# Patient Record
Sex: MALE | Race: WHITE | HISPANIC OR LATINO | URBAN - METROPOLITAN AREA
[De-identification: names, ages, dates, MRNs, and addresses within clinical notes are randomized per-mention and may not be internally consistent; named-entity substitution may affect disease eponyms.]

---

## 2022-11-29 ENCOUNTER — HOSPITAL ENCOUNTER (EMERGENCY)
Facility: OTHER | Age: 27
Discharge: HOME | End: 2022-11-29
Attending: EMERGENCY MEDICINE
Payer: COMMERCIAL

## 2022-11-29 VITALS
TEMPERATURE: 98 F | OXYGEN SATURATION: 99 % | HEART RATE: 76 BPM | DIASTOLIC BLOOD PRESSURE: 82 MMHG | RESPIRATION RATE: 20 BRPM | SYSTOLIC BLOOD PRESSURE: 134 MMHG | WEIGHT: 130 LBS

## 2022-11-29 DIAGNOSIS — S20.212A CONTUSION OF RIB ON LEFT SIDE, INITIAL ENCOUNTER: ICD-10-CM

## 2022-11-29 DIAGNOSIS — S16.1XXA STRAIN OF NECK MUSCLE, INITIAL ENCOUNTER: Primary | ICD-10-CM

## 2022-11-29 DIAGNOSIS — R07.81 RIB PAIN: ICD-10-CM

## 2022-11-29 PROCEDURE — 99285 EMERGENCY DEPT VISIT HI MDM: CPT | Mod: 25

## 2022-11-29 PROCEDURE — 96372 THER/PROPH/DIAG INJ SC/IM: CPT | Performed by: EMERGENCY MEDICINE

## 2022-11-29 PROCEDURE — 63600175 PHARM REV CODE 636 W HCPCS: Performed by: EMERGENCY MEDICINE

## 2022-11-29 PROCEDURE — 25000003 PHARM REV CODE 250: Performed by: EMERGENCY MEDICINE

## 2022-11-29 RX ORDER — METHOCARBAMOL 500 MG/1
1000 TABLET, FILM COATED ORAL
Status: COMPLETED | OUTPATIENT
Start: 2022-11-29 | End: 2022-11-29

## 2022-11-29 RX ORDER — METHOCARBAMOL 500 MG/1
1000 TABLET, FILM COATED ORAL 3 TIMES DAILY PRN
Qty: 20 TABLET | Refills: 0 | Status: SHIPPED | OUTPATIENT
Start: 2022-11-29 | End: 2022-12-04

## 2022-11-29 RX ORDER — KETOROLAC TROMETHAMINE 30 MG/ML
30 INJECTION, SOLUTION INTRAMUSCULAR; INTRAVENOUS
Status: COMPLETED | OUTPATIENT
Start: 2022-11-29 | End: 2022-11-29

## 2022-11-29 RX ORDER — IBUPROFEN 800 MG/1
800 TABLET ORAL EVERY 6 HOURS PRN
Qty: 20 TABLET | Refills: 0 | Status: SHIPPED | OUTPATIENT
Start: 2022-11-29

## 2022-11-29 RX ADMIN — METHOCARBAMOL 1000 MG: 500 TABLET ORAL at 09:11

## 2022-11-29 RX ADMIN — KETOROLAC TROMETHAMINE 30 MG: 30 INJECTION, SOLUTION INTRAMUSCULAR; INTRAVENOUS at 09:11
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
## 2022-11-29 NOTE — DISCHARGE INSTRUCTIONS
We have prescribed medication for pain.  Please fill and take as directed.    Please return to the ER if you have chest pain, difficulty breathing, fevers, altered mental status, dizziness, weakness, or any other concerns.      Follow up with your primary care physician.

## 2022-11-29 NOTE — Clinical Note
"Bharat Morel" Fermin was seen and treated in our emergency department on 11/29/2022.  He may return to work on 11/30/2022.       If you have any questions or concerns, please don't hesitate to call.       RN    "

## 2024-03-06 ENCOUNTER — HOSPITAL ENCOUNTER (INPATIENT)
Facility: OTHER | Age: 29
LOS: 3 days | Discharge: HOME | DRG: 398 | End: 2024-03-10
Attending: EMERGENCY MEDICINE | Admitting: HOSPITALIST

## 2024-03-06 DIAGNOSIS — K35.80 ACUTE APPENDICITIS: ICD-10-CM

## 2024-03-06 DIAGNOSIS — K37 APPENDICITIS: ICD-10-CM

## 2024-03-06 DIAGNOSIS — K35.30 ACUTE APPENDICITIS WITH LOCALIZED PERITONITIS, WITHOUT PERFORATION, ABSCESS, OR GANGRENE: Primary | ICD-10-CM

## 2024-03-06 LAB
ALBUMIN SERPL BCP-MCNC: 4.2 G/DL (ref 3.5–5.2)
ALP SERPL-CCNC: 70 U/L (ref 55–135)
ALT SERPL W/O P-5'-P-CCNC: 13 U/L (ref 10–44)
ANION GAP SERPL CALC-SCNC: 11 MMOL/L (ref 8–16)
AST SERPL-CCNC: 16 U/L (ref 10–40)
BASOPHILS # BLD AUTO: 0.03 K/UL (ref 0–0.2)
BASOPHILS NFR BLD: 0.2 % (ref 0–1.9)
BILIRUB SERPL-MCNC: 0.6 MG/DL (ref 0.1–1)
BILIRUB UR QL STRIP: NEGATIVE
BUN SERPL-MCNC: 7 MG/DL (ref 6–20)
CALCIUM SERPL-MCNC: 9.6 MG/DL (ref 8.7–10.5)
CHLORIDE SERPL-SCNC: 98 MMOL/L (ref 95–110)
CLARITY UR: CLEAR
CO2 SERPL-SCNC: 28 MMOL/L (ref 23–29)
COLOR UR: YELLOW
CREAT SERPL-MCNC: 1.1 MG/DL (ref 0.5–1.4)
DIFFERENTIAL METHOD BLD: ABNORMAL
EOSINOPHIL # BLD AUTO: 0 K/UL (ref 0–0.5)
EOSINOPHIL NFR BLD: 0.1 % (ref 0–8)
ERYTHROCYTE [DISTWIDTH] IN BLOOD BY AUTOMATED COUNT: 11.9 % (ref 11.5–14.5)
EST. GFR  (NO RACE VARIABLE): >60 ML/MIN/1.73 M^2
GLUCOSE SERPL-MCNC: 110 MG/DL (ref 70–110)
GLUCOSE UR QL STRIP: NEGATIVE
HCT VFR BLD AUTO: 43 % (ref 40–54)
HCV AB SERPL QL IA: NEGATIVE
HGB BLD-MCNC: 14.9 G/DL (ref 14–18)
HGB UR QL STRIP: NEGATIVE
HIV 1+2 AB+HIV1 P24 AG SERPL QL IA: NEGATIVE
IMM GRANULOCYTES # BLD AUTO: 0.07 K/UL (ref 0–0.04)
IMM GRANULOCYTES NFR BLD AUTO: 0.5 % (ref 0–0.5)
KETONES UR QL STRIP: NEGATIVE
LACTATE SERPL-SCNC: 0.8 MMOL/L (ref 0.5–2.2)
LACTATE SERPL-SCNC: 1.5 MMOL/L (ref 0.5–2.2)
LEUKOCYTE ESTERASE UR QL STRIP: NEGATIVE
LIPASE SERPL-CCNC: 21 U/L (ref 4–60)
LYMPHOCYTES # BLD AUTO: 0.8 K/UL (ref 1–4.8)
LYMPHOCYTES NFR BLD: 5.4 % (ref 18–48)
MCH RBC QN AUTO: 30.5 PG (ref 27–31)
MCHC RBC AUTO-ENTMCNC: 34.7 G/DL (ref 32–36)
MCV RBC AUTO: 88 FL (ref 82–98)
MONOCYTES # BLD AUTO: 0.9 K/UL (ref 0.3–1)
MONOCYTES NFR BLD: 6.5 % (ref 4–15)
NEUTROPHILS # BLD AUTO: 12.6 K/UL (ref 1.8–7.7)
NEUTROPHILS NFR BLD: 87.3 % (ref 38–73)
NITRITE UR QL STRIP: NEGATIVE
NRBC BLD-RTO: 0 /100 WBC
PH UR STRIP: 7 [PH] (ref 5–8)
PLATELET # BLD AUTO: 280 K/UL (ref 150–450)
PMV BLD AUTO: 8.7 FL (ref 9.2–12.9)
POTASSIUM SERPL-SCNC: 3.6 MMOL/L (ref 3.5–5.1)
PROT SERPL-MCNC: 7.9 G/DL (ref 6–8.4)
PROT UR QL STRIP: NEGATIVE
RBC # BLD AUTO: 4.89 M/UL (ref 4.6–6.2)
SODIUM SERPL-SCNC: 137 MMOL/L (ref 136–145)
SP GR UR STRIP: 1.01 (ref 1–1.03)
URN SPEC COLLECT METH UR: NORMAL
UROBILINOGEN UR STRIP-ACNC: NEGATIVE EU/DL
WBC # BLD AUTO: 14.46 K/UL (ref 3.9–12.7)

## 2024-03-06 PROCEDURE — 87389 HIV-1 AG W/HIV-1&-2 AB AG IA: CPT | Performed by: EMERGENCY MEDICINE

## 2024-03-06 PROCEDURE — 96361 HYDRATE IV INFUSION ADD-ON: CPT

## 2024-03-06 PROCEDURE — 99285 EMERGENCY DEPT VISIT HI MDM: CPT | Mod: 25

## 2024-03-06 PROCEDURE — 25500020 PHARM REV CODE 255: Performed by: EMERGENCY MEDICINE

## 2024-03-06 PROCEDURE — 87040 BLOOD CULTURE FOR BACTERIA: CPT | Mod: 59 | Performed by: EMERGENCY MEDICINE

## 2024-03-06 PROCEDURE — 86803 HEPATITIS C AB TEST: CPT | Performed by: EMERGENCY MEDICINE

## 2024-03-06 PROCEDURE — 63600175 PHARM REV CODE 636 W HCPCS: Performed by: INTERNAL MEDICINE

## 2024-03-06 PROCEDURE — 96375 TX/PRO/DX INJ NEW DRUG ADDON: CPT

## 2024-03-06 PROCEDURE — 85025 COMPLETE CBC W/AUTO DIFF WBC: CPT | Performed by: EMERGENCY MEDICINE

## 2024-03-06 PROCEDURE — 81003 URINALYSIS AUTO W/O SCOPE: CPT | Performed by: EMERGENCY MEDICINE

## 2024-03-06 PROCEDURE — 80053 COMPREHEN METABOLIC PANEL: CPT | Performed by: EMERGENCY MEDICINE

## 2024-03-06 PROCEDURE — 25000003 PHARM REV CODE 250: Performed by: EMERGENCY MEDICINE

## 2024-03-06 PROCEDURE — 96376 TX/PRO/DX INJ SAME DRUG ADON: CPT

## 2024-03-06 PROCEDURE — 36415 COLL VENOUS BLD VENIPUNCTURE: CPT | Performed by: EMERGENCY MEDICINE

## 2024-03-06 PROCEDURE — 83690 ASSAY OF LIPASE: CPT | Performed by: EMERGENCY MEDICINE

## 2024-03-06 PROCEDURE — 25000003 PHARM REV CODE 250: Performed by: INTERNAL MEDICINE

## 2024-03-06 PROCEDURE — 63600175 PHARM REV CODE 636 W HCPCS: Performed by: EMERGENCY MEDICINE

## 2024-03-06 PROCEDURE — 83605 ASSAY OF LACTIC ACID: CPT | Performed by: EMERGENCY MEDICINE

## 2024-03-06 PROCEDURE — 96365 THER/PROPH/DIAG IV INF INIT: CPT

## 2024-03-06 RX ORDER — KETOROLAC TROMETHAMINE 30 MG/ML
15 INJECTION, SOLUTION INTRAMUSCULAR; INTRAVENOUS
Status: COMPLETED | OUTPATIENT
Start: 2024-03-06 | End: 2024-03-06

## 2024-03-06 RX ORDER — HYDROMORPHONE HYDROCHLORIDE 1 MG/ML
0.5 INJECTION, SOLUTION INTRAMUSCULAR; INTRAVENOUS; SUBCUTANEOUS
Status: COMPLETED | OUTPATIENT
Start: 2024-03-06 | End: 2024-03-06

## 2024-03-06 RX ORDER — ONDANSETRON HYDROCHLORIDE 2 MG/ML
4 INJECTION, SOLUTION INTRAVENOUS
Status: COMPLETED | OUTPATIENT
Start: 2024-03-06 | End: 2024-03-06

## 2024-03-06 RX ORDER — ACETAMINOPHEN 500 MG
1000 TABLET ORAL
Status: COMPLETED | OUTPATIENT
Start: 2024-03-06 | End: 2024-03-06

## 2024-03-06 RX ADMIN — KETOROLAC TROMETHAMINE 15 MG: 30 INJECTION, SOLUTION INTRAMUSCULAR; INTRAVENOUS at 07:03

## 2024-03-06 RX ADMIN — IOHEXOL 75 ML: 350 INJECTION, SOLUTION INTRAVENOUS at 09:03

## 2024-03-06 RX ADMIN — HYDROMORPHONE HYDROCHLORIDE 0.5 MG: 0.5 INJECTION, SOLUTION INTRAMUSCULAR; INTRAVENOUS; SUBCUTANEOUS at 07:03

## 2024-03-06 RX ADMIN — SODIUM CHLORIDE 1000 ML: 0.9 INJECTION, SOLUTION INTRAVENOUS at 09:03

## 2024-03-06 RX ADMIN — HYDROMORPHONE HYDROCHLORIDE 0.5 MG: 0.5 INJECTION, SOLUTION INTRAMUSCULAR; INTRAVENOUS; SUBCUTANEOUS at 10:03

## 2024-03-06 RX ADMIN — ACETAMINOPHEN 1000 MG: 500 TABLET ORAL at 09:03

## 2024-03-06 RX ADMIN — SODIUM CHLORIDE 1000 ML: 0.9 INJECTION, SOLUTION INTRAVENOUS at 07:03

## 2024-03-06 RX ADMIN — ONDANSETRON 4 MG: 2 INJECTION INTRAMUSCULAR; INTRAVENOUS at 07:03

## 2024-03-06 RX ADMIN — PIPERACILLIN SODIUM AND TAZOBACTAM SODIUM 4.5 G: 4; .5 INJECTION, POWDER, LYOPHILIZED, FOR SOLUTION INTRAVENOUS at 09:03

## 2024-03-07 ENCOUNTER — ANESTHESIA EVENT (OUTPATIENT)
Dept: SURGERY | Facility: OTHER | Age: 29
DRG: 398 | End: 2024-03-07

## 2024-03-07 ENCOUNTER — ANESTHESIA (OUTPATIENT)
Dept: SURGERY | Facility: OTHER | Age: 29
DRG: 398 | End: 2024-03-07

## 2024-03-07 PROBLEM — K35.30 ACUTE APPENDICITIS WITH LOCALIZED PERITONITIS: Status: ACTIVE | Noted: 2024-03-07

## 2024-03-07 LAB
ALBUMIN SERPL BCP-MCNC: 3.6 G/DL (ref 3.5–5.2)
ALP SERPL-CCNC: 58 U/L (ref 55–135)
ALT SERPL W/O P-5'-P-CCNC: 12 U/L (ref 10–44)
ANION GAP SERPL CALC-SCNC: 8 MMOL/L (ref 8–16)
AST SERPL-CCNC: 16 U/L (ref 10–40)
BASOPHILS # BLD AUTO: 0.07 K/UL (ref 0–0.2)
BASOPHILS NFR BLD: 0.4 % (ref 0–1.9)
BILIRUB SERPL-MCNC: 0.7 MG/DL (ref 0.1–1)
BUN SERPL-MCNC: 7 MG/DL (ref 6–20)
CALCIUM SERPL-MCNC: 8.5 MG/DL (ref 8.7–10.5)
CHLORIDE SERPL-SCNC: 104 MMOL/L (ref 95–110)
CO2 SERPL-SCNC: 25 MMOL/L (ref 23–29)
CREAT SERPL-MCNC: 0.9 MG/DL (ref 0.5–1.4)
DIFFERENTIAL METHOD BLD: ABNORMAL
EOSINOPHIL # BLD AUTO: 0 K/UL (ref 0–0.5)
EOSINOPHIL NFR BLD: 0 % (ref 0–8)
ERYTHROCYTE [DISTWIDTH] IN BLOOD BY AUTOMATED COUNT: 12 % (ref 11.5–14.5)
EST. GFR  (NO RACE VARIABLE): >60 ML/MIN/1.73 M^2
GLUCOSE SERPL-MCNC: 112 MG/DL (ref 70–110)
HCT VFR BLD AUTO: 38.3 % (ref 40–54)
HGB BLD-MCNC: 13.3 G/DL (ref 14–18)
IMM GRANULOCYTES # BLD AUTO: 0.07 K/UL (ref 0–0.04)
IMM GRANULOCYTES NFR BLD AUTO: 0.4 % (ref 0–0.5)
LYMPHOCYTES # BLD AUTO: 0.8 K/UL (ref 1–4.8)
LYMPHOCYTES NFR BLD: 4.8 % (ref 18–48)
MAGNESIUM SERPL-MCNC: 1.6 MG/DL (ref 1.6–2.6)
MCH RBC QN AUTO: 30.7 PG (ref 27–31)
MCHC RBC AUTO-ENTMCNC: 34.7 G/DL (ref 32–36)
MCV RBC AUTO: 89 FL (ref 82–98)
MONOCYTES # BLD AUTO: 1 K/UL (ref 0.3–1)
MONOCYTES NFR BLD: 5.6 % (ref 4–15)
NEUTROPHILS # BLD AUTO: 15.4 K/UL (ref 1.8–7.7)
NEUTROPHILS NFR BLD: 88.8 % (ref 38–73)
NRBC BLD-RTO: 0 /100 WBC
PHOSPHATE SERPL-MCNC: 3.7 MG/DL (ref 2.7–4.5)
PLATELET # BLD AUTO: 217 K/UL (ref 150–450)
PMV BLD AUTO: 8.8 FL (ref 9.2–12.9)
POTASSIUM SERPL-SCNC: 3.8 MMOL/L (ref 3.5–5.1)
PROT SERPL-MCNC: 6.8 G/DL (ref 6–8.4)
RBC # BLD AUTO: 4.33 M/UL (ref 4.6–6.2)
SODIUM SERPL-SCNC: 137 MMOL/L (ref 136–145)
WBC # BLD AUTO: 17.32 K/UL (ref 3.9–12.7)

## 2024-03-07 PROCEDURE — 36415 COLL VENOUS BLD VENIPUNCTURE: CPT | Performed by: NURSE PRACTITIONER

## 2024-03-07 PROCEDURE — 63600175 PHARM REV CODE 636 W HCPCS: Performed by: SPECIALIST

## 2024-03-07 PROCEDURE — 64488 TAP BLOCK BI INJECTION: CPT | Mod: 59,,, | Performed by: ANESTHESIOLOGY

## 2024-03-07 PROCEDURE — 25000003 PHARM REV CODE 250: Performed by: SPECIALIST

## 2024-03-07 PROCEDURE — 63600175 PHARM REV CODE 636 W HCPCS

## 2024-03-07 PROCEDURE — 37000009 HC ANESTHESIA EA ADD 15 MINS: Performed by: SPECIALIST

## 2024-03-07 PROCEDURE — 25000003 PHARM REV CODE 250: Performed by: HOSPITALIST

## 2024-03-07 PROCEDURE — 25000003 PHARM REV CODE 250: Performed by: NURSE PRACTITIONER

## 2024-03-07 PROCEDURE — 25000003 PHARM REV CODE 250: Performed by: STUDENT IN AN ORGANIZED HEALTH CARE EDUCATION/TRAINING PROGRAM

## 2024-03-07 PROCEDURE — 63600175 PHARM REV CODE 636 W HCPCS: Performed by: NURSE PRACTITIONER

## 2024-03-07 PROCEDURE — 63600175 PHARM REV CODE 636 W HCPCS: Performed by: STUDENT IN AN ORGANIZED HEALTH CARE EDUCATION/TRAINING PROGRAM

## 2024-03-07 PROCEDURE — 64488 TAP BLOCK BI INJECTION: CPT | Performed by: ANESTHESIOLOGY

## 2024-03-07 PROCEDURE — 44970 LAPAROSCOPY APPENDECTOMY: CPT | Mod: ,,, | Performed by: SPECIALIST

## 2024-03-07 PROCEDURE — 0DTJ4ZZ RESECTION OF APPENDIX, PERCUTANEOUS ENDOSCOPIC APPROACH: ICD-10-PCS | Performed by: SPECIALIST

## 2024-03-07 PROCEDURE — 63600175 PHARM REV CODE 636 W HCPCS: Performed by: NURSE ANESTHETIST, CERTIFIED REGISTERED

## 2024-03-07 PROCEDURE — 36000709 HC OR TIME LEV III EA ADD 15 MIN: Performed by: SPECIALIST

## 2024-03-07 PROCEDURE — D9220A PRA ANESTHESIA: Mod: ,,, | Performed by: ANESTHESIOLOGY

## 2024-03-07 PROCEDURE — 25000003 PHARM REV CODE 250: Performed by: ANESTHESIOLOGY

## 2024-03-07 PROCEDURE — 25500020 PHARM REV CODE 255: Performed by: EMERGENCY MEDICINE

## 2024-03-07 PROCEDURE — A9698 NON-RAD CONTRAST MATERIALNOC: HCPCS | Performed by: EMERGENCY MEDICINE

## 2024-03-07 PROCEDURE — 88304 TISSUE EXAM BY PATHOLOGIST: CPT | Mod: 26,,, | Performed by: PATHOLOGY

## 2024-03-07 PROCEDURE — 21400001 HC TELEMETRY ROOM

## 2024-03-07 PROCEDURE — 88304 TISSUE EXAM BY PATHOLOGIST: CPT | Performed by: PATHOLOGY

## 2024-03-07 PROCEDURE — 36000708 HC OR TIME LEV III 1ST 15 MIN: Performed by: SPECIALIST

## 2024-03-07 PROCEDURE — 27201423 OPTIME MED/SURG SUP & DEVICES STERILE SUPPLY: Performed by: SPECIALIST

## 2024-03-07 PROCEDURE — C9290 INJ, BUPIVACAINE LIPOSOME: HCPCS | Performed by: ANESTHESIOLOGY

## 2024-03-07 PROCEDURE — 85025 COMPLETE CBC W/AUTO DIFF WBC: CPT | Performed by: NURSE PRACTITIONER

## 2024-03-07 PROCEDURE — 71000033 HC RECOVERY, INTIAL HOUR: Performed by: SPECIALIST

## 2024-03-07 PROCEDURE — 80053 COMPREHEN METABOLIC PANEL: CPT | Performed by: NURSE PRACTITIONER

## 2024-03-07 PROCEDURE — 83735 ASSAY OF MAGNESIUM: CPT | Performed by: NURSE PRACTITIONER

## 2024-03-07 PROCEDURE — 63600175 PHARM REV CODE 636 W HCPCS: Mod: JZ,JG | Performed by: ANESTHESIOLOGY

## 2024-03-07 PROCEDURE — 84100 ASSAY OF PHOSPHORUS: CPT | Performed by: NURSE PRACTITIONER

## 2024-03-07 PROCEDURE — 25000003 PHARM REV CODE 250: Performed by: NURSE ANESTHETIST, CERTIFIED REGISTERED

## 2024-03-07 PROCEDURE — 37000008 HC ANESTHESIA 1ST 15 MINUTES: Performed by: SPECIALIST

## 2024-03-07 RX ORDER — SODIUM CHLORIDE 0.9 % (FLUSH) 0.9 %
3 SYRINGE (ML) INJECTION
Status: DISCONTINUED | OUTPATIENT
Start: 2024-03-07 | End: 2024-03-10 | Stop reason: HOSPADM

## 2024-03-07 RX ORDER — ACETAMINOPHEN 10 MG/ML
INJECTION, SOLUTION INTRAVENOUS
Status: DISCONTINUED | OUTPATIENT
Start: 2024-03-07 | End: 2024-03-07

## 2024-03-07 RX ORDER — HYDROMORPHONE HYDROCHLORIDE 2 MG/ML
0.4 INJECTION, SOLUTION INTRAMUSCULAR; INTRAVENOUS; SUBCUTANEOUS EVERY 5 MIN PRN
Status: DISCONTINUED | OUTPATIENT
Start: 2024-03-07 | End: 2024-03-10

## 2024-03-07 RX ORDER — ONDANSETRON HYDROCHLORIDE 2 MG/ML
4 INJECTION, SOLUTION INTRAVENOUS EVERY 8 HOURS PRN
Status: DISCONTINUED | OUTPATIENT
Start: 2024-03-07 | End: 2024-03-10 | Stop reason: HOSPADM

## 2024-03-07 RX ORDER — MEPERIDINE HYDROCHLORIDE 25 MG/ML
12.5 INJECTION INTRAMUSCULAR; INTRAVENOUS; SUBCUTANEOUS ONCE AS NEEDED
Status: ACTIVE | OUTPATIENT
Start: 2024-03-07 | End: 2024-03-08

## 2024-03-07 RX ORDER — SODIUM CHLORIDE 9 MG/ML
INJECTION, SOLUTION INTRAVENOUS CONTINUOUS
Status: DISCONTINUED | OUTPATIENT
Start: 2024-03-07 | End: 2024-03-07

## 2024-03-07 RX ORDER — FENTANYL CITRATE 50 UG/ML
INJECTION, SOLUTION INTRAMUSCULAR; INTRAVENOUS
Status: DISCONTINUED | OUTPATIENT
Start: 2024-03-07 | End: 2024-03-07

## 2024-03-07 RX ORDER — ACETAMINOPHEN 325 MG/1
650 TABLET ORAL EVERY 4 HOURS PRN
Status: DISCONTINUED | OUTPATIENT
Start: 2024-03-07 | End: 2024-03-10 | Stop reason: HOSPADM

## 2024-03-07 RX ORDER — PROCHLORPERAZINE EDISYLATE 5 MG/ML
5 INJECTION INTRAMUSCULAR; INTRAVENOUS EVERY 30 MIN PRN
Status: DISCONTINUED | OUTPATIENT
Start: 2024-03-07 | End: 2024-03-10 | Stop reason: HOSPADM

## 2024-03-07 RX ORDER — DEXAMETHASONE SODIUM PHOSPHATE 4 MG/ML
INJECTION, SOLUTION INTRA-ARTICULAR; INTRALESIONAL; INTRAMUSCULAR; INTRAVENOUS; SOFT TISSUE
Status: DISCONTINUED | OUTPATIENT
Start: 2024-03-07 | End: 2024-03-07

## 2024-03-07 RX ORDER — SODIUM CHLORIDE 0.9 % (FLUSH) 0.9 %
10 SYRINGE (ML) INJECTION EVERY 8 HOURS PRN
Status: DISCONTINUED | OUTPATIENT
Start: 2024-03-07 | End: 2024-03-10 | Stop reason: HOSPADM

## 2024-03-07 RX ORDER — SUCCINYLCHOLINE CHLORIDE 20 MG/ML
INJECTION INTRAMUSCULAR; INTRAVENOUS
Status: DISCONTINUED | OUTPATIENT
Start: 2024-03-07 | End: 2024-03-07

## 2024-03-07 RX ORDER — DIPHENHYDRAMINE HYDROCHLORIDE 50 MG/ML
12.5 INJECTION INTRAMUSCULAR; INTRAVENOUS EVERY 30 MIN PRN
Status: DISCONTINUED | OUTPATIENT
Start: 2024-03-07 | End: 2024-03-10 | Stop reason: HOSPADM

## 2024-03-07 RX ORDER — BUPIVACAINE HYDROCHLORIDE 2.5 MG/ML
INJECTION, SOLUTION EPIDURAL; INFILTRATION; INTRACAUDAL
Status: COMPLETED | OUTPATIENT
Start: 2024-03-07 | End: 2024-03-07

## 2024-03-07 RX ORDER — ROCURONIUM BROMIDE 10 MG/ML
INJECTION, SOLUTION INTRAVENOUS
Status: DISCONTINUED | OUTPATIENT
Start: 2024-03-07 | End: 2024-03-07

## 2024-03-07 RX ORDER — OXYCODONE HYDROCHLORIDE 5 MG/1
5 TABLET ORAL EVERY 4 HOURS PRN
Status: DISCONTINUED | OUTPATIENT
Start: 2024-03-07 | End: 2024-03-10

## 2024-03-07 RX ORDER — PHENYLEPHRINE HYDROCHLORIDE 10 MG/ML
INJECTION INTRAVENOUS
Status: DISCONTINUED | OUTPATIENT
Start: 2024-03-07 | End: 2024-03-07

## 2024-03-07 RX ORDER — MUPIROCIN 20 MG/G
OINTMENT TOPICAL 2 TIMES DAILY
Status: DISCONTINUED | OUTPATIENT
Start: 2024-03-07 | End: 2024-03-10 | Stop reason: HOSPADM

## 2024-03-07 RX ORDER — PROPOFOL 10 MG/ML
VIAL (ML) INTRAVENOUS
Status: DISCONTINUED | OUTPATIENT
Start: 2024-03-07 | End: 2024-03-07

## 2024-03-07 RX ORDER — KETOROLAC TROMETHAMINE 30 MG/ML
INJECTION, SOLUTION INTRAMUSCULAR; INTRAVENOUS
Status: DISCONTINUED | OUTPATIENT
Start: 2024-03-07 | End: 2024-03-07

## 2024-03-07 RX ORDER — GLUCAGON 1 MG
1 KIT INJECTION
Status: DISCONTINUED | OUTPATIENT
Start: 2024-03-07 | End: 2024-03-10 | Stop reason: HOSPADM

## 2024-03-07 RX ORDER — MORPHINE SULFATE 2 MG/ML
2 INJECTION, SOLUTION INTRAMUSCULAR; INTRAVENOUS EVERY 4 HOURS PRN
Status: DISCONTINUED | OUTPATIENT
Start: 2024-03-07 | End: 2024-03-10

## 2024-03-07 RX ORDER — KETOROLAC TROMETHAMINE 30 MG/ML
30 INJECTION, SOLUTION INTRAMUSCULAR; INTRAVENOUS EVERY 6 HOURS PRN
Status: DISCONTINUED | OUTPATIENT
Start: 2024-03-07 | End: 2024-03-10 | Stop reason: HOSPADM

## 2024-03-07 RX ORDER — SODIUM CHLORIDE 9 MG/ML
INJECTION, SOLUTION INTRAVENOUS CONTINUOUS
Status: DISCONTINUED | OUTPATIENT
Start: 2024-03-07 | End: 2024-03-10

## 2024-03-07 RX ORDER — IBUPROFEN 200 MG
16 TABLET ORAL
Status: DISCONTINUED | OUTPATIENT
Start: 2024-03-07 | End: 2024-03-10 | Stop reason: HOSPADM

## 2024-03-07 RX ORDER — ONDANSETRON HYDROCHLORIDE 2 MG/ML
INJECTION, SOLUTION INTRAMUSCULAR; INTRAVENOUS
Status: DISCONTINUED | OUTPATIENT
Start: 2024-03-07 | End: 2024-03-07

## 2024-03-07 RX ORDER — NALOXONE HCL 0.4 MG/ML
0.02 VIAL (ML) INJECTION
Status: DISCONTINUED | OUTPATIENT
Start: 2024-03-07 | End: 2024-03-10 | Stop reason: HOSPADM

## 2024-03-07 RX ORDER — IBUPROFEN 200 MG
24 TABLET ORAL
Status: DISCONTINUED | OUTPATIENT
Start: 2024-03-07 | End: 2024-03-10 | Stop reason: HOSPADM

## 2024-03-07 RX ORDER — MIDAZOLAM HYDROCHLORIDE 1 MG/ML
INJECTION, SOLUTION INTRAMUSCULAR; INTRAVENOUS
Status: DISCONTINUED | OUTPATIENT
Start: 2024-03-07 | End: 2024-03-07

## 2024-03-07 RX ORDER — LIDOCAINE HYDROCHLORIDE 20 MG/ML
INJECTION, SOLUTION EPIDURAL; INFILTRATION; INTRACAUDAL; PERINEURAL
Status: DISCONTINUED | OUTPATIENT
Start: 2024-03-07 | End: 2024-03-07

## 2024-03-07 RX ORDER — HYDROCODONE BITARTRATE AND ACETAMINOPHEN 7.5; 325 MG/1; MG/1
1 TABLET ORAL EVERY 6 HOURS PRN
Status: DISCONTINUED | OUTPATIENT
Start: 2024-03-07 | End: 2024-03-10

## 2024-03-07 RX ADMIN — SODIUM CHLORIDE: 9 INJECTION, SOLUTION INTRAVENOUS at 02:03

## 2024-03-07 RX ADMIN — MUPIROCIN: 20 OINTMENT TOPICAL at 08:03

## 2024-03-07 RX ADMIN — PHENYLEPHRINE HYDROCHLORIDE 100 MCG: 10 INJECTION INTRAVENOUS at 11:03

## 2024-03-07 RX ADMIN — BUPIVACAINE HYDROCHLORIDE 40 ML: 2.5 INJECTION, SOLUTION EPIDURAL; INFILTRATION; INTRACAUDAL; PERINEURAL at 10:03

## 2024-03-07 RX ADMIN — SODIUM CHLORIDE, SODIUM LACTATE, POTASSIUM CHLORIDE, AND CALCIUM CHLORIDE: .6; .31; .03; .02 INJECTION, SOLUTION INTRAVENOUS at 11:03

## 2024-03-07 RX ADMIN — DEXAMETHASONE SODIUM PHOSPHATE 8 MG: 4 INJECTION, SOLUTION INTRAMUSCULAR; INTRAVENOUS at 11:03

## 2024-03-07 RX ADMIN — LIDOCAINE HYDROCHLORIDE 40 MG: 20 INJECTION, SOLUTION EPIDURAL; INFILTRATION; INTRACAUDAL; PERINEURAL at 10:03

## 2024-03-07 RX ADMIN — KETOROLAC TROMETHAMINE 30 MG: 30 INJECTION, SOLUTION INTRAMUSCULAR; INTRAVENOUS at 11:03

## 2024-03-07 RX ADMIN — MUPIROCIN: 20 OINTMENT TOPICAL at 02:03

## 2024-03-07 RX ADMIN — IOHEXOL 1000 ML: 9 SOLUTION ORAL at 12:03

## 2024-03-07 RX ADMIN — SUGAMMADEX 200 MG: 100 INJECTION, SOLUTION INTRAVENOUS at 11:03

## 2024-03-07 RX ADMIN — ROCURONIUM BROMIDE 40 MG: 10 SOLUTION INTRAVENOUS at 10:03

## 2024-03-07 RX ADMIN — ACETAMINOPHEN 1000 MG: 10 INJECTION, SOLUTION INTRAVENOUS at 11:03

## 2024-03-07 RX ADMIN — PIPERACILLIN SODIUM AND TAZOBACTAM SODIUM 4.5 G: 4; .5 INJECTION, POWDER, LYOPHILIZED, FOR SOLUTION INTRAVENOUS at 02:03

## 2024-03-07 RX ADMIN — FENTANYL CITRATE 100 MCG: 50 INJECTION, SOLUTION INTRAMUSCULAR; INTRAVENOUS at 10:03

## 2024-03-07 RX ADMIN — PIPERACILLIN SODIUM AND TAZOBACTAM SODIUM 4.5 G: 4; .5 INJECTION, POWDER, LYOPHILIZED, FOR SOLUTION INTRAVENOUS at 11:03

## 2024-03-07 RX ADMIN — ONDANSETRON 4 MG: 2 INJECTION INTRAMUSCULAR; INTRAVENOUS at 11:03

## 2024-03-07 RX ADMIN — PIPERACILLIN SODIUM AND TAZOBACTAM SODIUM 4.5 G: 4; .5 INJECTION, POWDER, LYOPHILIZED, FOR SOLUTION INTRAVENOUS at 05:03

## 2024-03-07 RX ADMIN — ACETAMINOPHEN 650 MG: 325 TABLET, FILM COATED ORAL at 02:03

## 2024-03-07 RX ADMIN — MIDAZOLAM HYDROCHLORIDE 2 MG: 1 INJECTION, SOLUTION INTRAMUSCULAR; INTRAVENOUS at 10:03

## 2024-03-07 RX ADMIN — PROPOFOL 150 MG: 10 INJECTION, EMULSION INTRAVENOUS at 10:03

## 2024-03-07 RX ADMIN — HYDROCODONE BITARTRATE AND ACETAMINOPHEN 1 TABLET: 7.5; 325 TABLET ORAL at 11:03

## 2024-03-07 RX ADMIN — SODIUM CHLORIDE, SODIUM LACTATE, POTASSIUM CHLORIDE, AND CALCIUM CHLORIDE: .6; .31; .03; .02 INJECTION, SOLUTION INTRAVENOUS at 10:03

## 2024-03-07 RX ADMIN — OXYCODONE 5 MG: 5 TABLET ORAL at 12:03

## 2024-03-07 RX ADMIN — BUPIVACAINE 20 ML: 13.3 INJECTION, SUSPENSION, LIPOSOMAL INFILTRATION at 10:03

## 2024-03-07 RX ADMIN — SUCCINYLCHOLINE CHLORIDE 120 MG: 20 INJECTION, SOLUTION INTRAMUSCULAR; INTRAVENOUS at 10:03

## 2024-03-07 RX ADMIN — MORPHINE SULFATE 2 MG: 2 INJECTION, SOLUTION INTRAMUSCULAR; INTRAVENOUS at 07:03

## 2024-03-07 RX ADMIN — FENTANYL CITRATE 50 MCG: 50 INJECTION, SOLUTION INTRAMUSCULAR; INTRAVENOUS at 11:03

## 2024-03-07 RX ADMIN — ROCURONIUM BROMIDE 10 MG: 10 SOLUTION INTRAVENOUS at 11:03

## 2024-03-07 NOTE — TRANSFER OF CARE
"Anesthesia Transfer of Care Note    Patient: Bharat Mcdonough    Procedure(s) Performed: Procedure(s) (LRB):  APPENDECTOMY, LAPAROSCOPIC (N/A)    Patient location: PACU    Anesthesia Type: general    Transport from OR: Transported from OR on 6-10 L/min O2 by face mask with adequate spontaneous ventilation    Post pain: adequate analgesia    Post assessment: no apparent anesthetic complications and tolerated procedure well    Post vital signs: stable    Level of consciousness: awake    Nausea/Vomiting: no nausea/vomiting    Complications: none    Transfer of care protocol was followed      Last vitals: Visit Vitals  /61 (BP Location: Left arm, Patient Position: Lying)   Pulse 95   Temp 37.1 °C (98.7 °F) (Oral)   Resp 18   Ht 5' 8.11" (1.73 m)   Wt 62.6 kg (138 lb 0.1 oz)   SpO2 100%   BMI 20.92 kg/m²     "

## 2024-03-07 NOTE — PROGRESS NOTES
"[facility] ([place])  Adult Nutrition  Progress Note    SUMMARY       Recommendations  1) When medically able, adv diet to general healthful diet   2) Monitor weights and labs    3) RD to monitor and follow up    Goals:   Pt will have diet adv and able to tolerate >75% intake of meals by RD follow up  Nutrition Goal Status: new  Communication of RD Recs:  (POC)    Assessment and Plan  Nutrition Problem  Inadequate oral intake    Related to (etiology):   Diagnosis related symptoms    Signs and Symptoms (as evidenced by):   NPO status  Abdominal pain with N/V    Interventions (treatment strategy):  Collaboration with other providers    Nutrition Diagnosis Status:   New      Reason for Assessment    Reason For Assessment: identified at risk by screening criteria  Diagnosis:  (Acute appendicitis with localized peritonitis)  Relevant Medical History:   Patient Active Problem List   Diagnosis    Acute appendicitis with localized peritonitis     Interdisciplinary Rounds: did not attend  General Information Comments: Pt off of floor 2/2 procedure (appendectomy). NPO. Pt admitted with c/o RLQ abd pain and N/V x3 days. No recent weights in chart to review weight loss.  needed: Croatian. NIXON Zhang 21 - skin intact.  Nutrition Discharge Planning: dc on general healthful diet    Nutrition Risk Screen    Nutrition Risk Screen: no indicators present    Nutrition/Diet History    Factors Affecting Nutritional Intake: abdominal pain, nausea/vomiting, NPO    Nutrition Related Social Determinants of Health: SDOH: Unable to assess at this time.     Anthropometrics    Temp: 97.8 °F (36.6 °C)  Height Method: Stated  Height: 5' 8.11" (173 cm)  Height (inches): 68.11 in  Weight Method: Bed Scale  Weight: 62.6 kg (138 lb 0.1 oz)  Weight (lb): 138.01 lb  Ideal Body Weight (IBW), Male: 154.66 lb  % Ideal Body Weight, Male (lb): 89.23 %  BMI (Calculated): 20.9  BMI Grade: 18.5-24.9 - normal   "     Lab/Procedures/Meds    Pertinent Labs Reviewed: reviewed  CBC:  Recent Labs   Lab 03/07/24  0422   WBC 17.32*   HGB 13.3*   HCT 38.3*        CMP:  Recent Labs   Lab 03/07/24  0422   CALCIUM 8.5*   ALBUMIN 3.6   PROT 6.8      K 3.8   CO2 25      BUN 7   CREATININE 0.9   ALKPHOS 58   ALT 12   AST 16   BILITOT 0.7     BMP:   Recent Labs   Lab 03/07/24  0422   *      K 3.8      CO2 25   BUN 7   CREATININE 0.9   CALCIUM 8.5*   MG 1.6       Pertinent Medications Reviewed: reviewed  Scheduled Meds:   mupirocin   Nasal BID    piperacillin-tazobactam (Zosyn) IV (PEDS and ADULTS) (extended infusion is not appropriate)  4.5 g Intravenous Q8H     Continuous Infusions:   sodium chloride 0.9% 125 mL/hr at 03/07/24 0238     PRN Meds:.acetaminophen, dextrose 10%, dextrose 10%, diphenhydrAMINE, glucagon (human recombinant), glucose, glucose, HYDROmorphone, meperidine, morphine, naloxone, ondansetron, oxyCODONE, prochlorperazine, sodium chloride 0.9%, sodium chloride 0.9%    Estimated/Assessed Needs    Weight Used For Calorie Calculations: 62.6 kg (138 lb 0.1 oz)  Energy Calorie Requirements (kcal): 2413-5628  Energy Need Method: Kcal/kg (25-30)  Protein Requirements: 50 g (0.8 g/kg)  Weight Used For Protein Calculations: 62.6 kg (138 lb 0.1 oz)  Fluid Requirements (mL): 1 mL/kcal  Estimated Fluid Requirement Method:  (or per MD)  RDA Method (mL): 1565  CHO Requirement: 196 g (50% total kcal)      Nutrition Prescription Ordered    Current Diet Order: NPO    Evaluation of Received Nutrient/Fluid Intake    I/O: - 500 mL since admit  Energy Calories Required: not meeting needs  Protein Required: not meeting needs  Fluid Required: meeting needs  Comments: LBM: 3/6/24  Tolerance:  (NPO)  % Intake of Estimated Energy Needs: Other: NPO  % Meal Intake: NPO    Intake/Output - Last 3 Shifts         03/05 0700 03/06 0659 03/06 0700 03/07 0659 03/07 0700 03/08 0659    P.O.  0     IV Piggyback   1400     Total Intake(mL/kg)  0 (0) 1400 (22.4)    Urine (mL/kg/hr)  1800 100 (0.3)    Total Output  1800 100    Net  -1800 +1300                   Nutrition Risk    Level of Risk/Frequency of Follow-up:  (l)     Monitor and Evaluation    Food and Nutrient Intake: energy intake, food and beverage intake  Food and Nutrient Adminstration: diet order  Physical Activity and Function: nutrition-related ADLs and IADLs  Anthropometric Measurements: weight, weight change  Biochemical Data, Medical Tests and Procedures: electrolyte and renal panel, gastrointestinal profile, glucose/endocrine profile, inflammatory profile, lipid profile     Nutrition Follow-Up    RD Follow-up?: Yes    Leann Barker RDN, OTISN

## 2024-03-07 NOTE — SUBJECTIVE & OBJECTIVE
No past medical history on file.    No past surgical history on file.    Review of patient's allergies indicates:  No Known Allergies    No current facility-administered medications on file prior to encounter.     Current Outpatient Medications on File Prior to Encounter   Medication Sig    ibuprofen (ADVIL,MOTRIN) 800 MG tablet Take 1 tablet (800 mg total) by mouth every 6 (six) hours as needed for Pain.     Family History    None       Tobacco Use    Smoking status: Never    Smokeless tobacco: Not on file   Substance and Sexual Activity    Alcohol use: Not Currently    Drug use: Yes     Types: Marijuana    Sexual activity: Not on file     Review of Systems   Constitutional:  Positive for fever. Negative for activity change, appetite change and fatigue.   HENT:  Negative for congestion, ear pain and postnasal drip.    Eyes:  Negative for discharge.   Respiratory:  Negative for apnea, shortness of breath and wheezing.    Cardiovascular:  Negative for chest pain and leg swelling.   Gastrointestinal:  Positive for abdominal pain, nausea and vomiting. Negative for abdominal distention.   Endocrine: Negative for polydipsia, polyphagia and polyuria.   Genitourinary:  Negative for difficulty urinating, flank pain, frequency, hematuria and urgency.   Musculoskeletal:  Negative for arthralgias and joint swelling.   Skin:  Negative for pallor and rash.   Allergic/Immunologic: Negative for environmental allergies and food allergies.   Neurological:  Negative for dizziness, speech difficulty, weakness, light-headedness and headaches.   Hematological:  Does not bruise/bleed easily.   Psychiatric/Behavioral:  Negative for agitation.      Objective:     Vital Signs (Most Recent):  Temp: (!) 100.5 °F (38.1 °C) (03/07/24 0222)  Pulse: 109 (03/07/24 0132)  Resp: (!) 25 (03/07/24 0132)  BP: 111/70 (03/07/24 0132)  SpO2: 97 % (03/07/24 0132) Vital Signs (24h Range):  Temp:  [99.6 °F (37.6 °C)-102.8 °F (39.3 °C)] 100.5 °F (38.1  °C)  Pulse:  [109-129] 109  Resp:  [17-25] 25  SpO2:  [96 %-99 %] 97 %  BP: (111-151)/(64-92) 111/70     Weight: 63.5 kg (140 lb)  Body mass index is 23.9 kg/m².     Physical Exam  Constitutional:       Appearance: Normal appearance. He is well-developed.   HENT:      Head: Normocephalic.   Eyes:      Conjunctiva/sclera: Conjunctivae normal.   Cardiovascular:      Rate and Rhythm: Regular rhythm. Tachycardia present.      Pulses:           Radial pulses are 2+ on the right side and 2+ on the left side.      Heart sounds: Normal heart sounds.   Pulmonary:      Effort: Pulmonary effort is normal. Tachypnea present.      Breath sounds: Normal breath sounds.   Abdominal:      General: Bowel sounds are decreased. There is no distension.      Palpations: Abdomen is soft.      Tenderness: There is abdominal tenderness in the right lower quadrant.   Musculoskeletal:         General: Normal range of motion.      Cervical back: Normal range of motion and neck supple.   Skin:     General: Skin is warm and dry.   Neurological:      Mental Status: He is alert and oriented to person, place, and time.      GCS: GCS eye subscore is 4. GCS verbal subscore is 5. GCS motor subscore is 6.      Motor: Motor function is intact.   Psychiatric:         Mood and Affect: Mood normal.         Speech: Speech normal.         Behavior: Behavior normal.                Significant Labs: All pertinent labs within the past 24 hours have been reviewed.  CBC:   Recent Labs   Lab 03/06/24 1942   WBC 14.46*   HGB 14.9   HCT 43.0        CMP:   Recent Labs   Lab 03/06/24 1942      K 3.6   CL 98   CO2 28      BUN 7   CREATININE 1.1   CALCIUM 9.6   PROT 7.9   ALBUMIN 4.2   BILITOT 0.6   ALKPHOS 70   AST 16   ALT 13   ANIONGAP 11       Significant Imaging: I have reviewed all pertinent imaging results/findings within the past 24 hours.

## 2024-03-07 NOTE — ASSESSMENT & PLAN NOTE
CT- Suspected dilated appendix arising from the posteromedial cecum with adjacent fat stranding and ill-defined fluid suspicious for acute appendicitis.  Previously noted mild wall thickening of the terminal ileum appears somewhat less conspicuous.  Mildly dilated loops of small bowel again identified within the lower abdomen and pelvis without discrete transition point which may reflect delayed transit/ileus secondary to pelvic inflammation.  A nonspecific distal enteritis is felt less likely.    Consult General Surgery  Zosyn  Morphine/Zofran  IVF  NPO

## 2024-03-07 NOTE — ANESTHESIA PROCEDURE NOTES
Intubation    Date/Time: 3/7/2024 10:57 AM    Performed by: Grace Parkinson CRNA  Authorized by: Titus Escamilla MD    Intubation:     Induction:  Rapid sequence induction    Intubated:  Postinduction    Mask Ventilation:  Not attempted    Attempts:  1    Attempted By:  CRNA    Method of Intubation:  Video laryngoscopy    Blade:  Abraham 3    Laryngeal View Grade: Grade I - full view of cords      Difficult Airway Encountered?: No      Complications:  None    Airway Device:  Oral endotracheal tube    Airway Device Size:  7.5    Style/Cuff Inflation:  Cuffed (inflated to minimal occlusive pressure)    Tube secured:  22    Secured at:  The lips    Placement Verified By:  Capnometry    Complicating Factors:  None    Findings Post-Intubation:  BS equal bilateral and atraumatic/condition of teeth unchanged

## 2024-03-07 NOTE — HPI
The patient is a 28 y.o. male with no significant past medical history who presents with complaint of abdominal pain. He states that the pain began 3 days ago and is localized to his right side. He also complains of vomiting, fever and intermittent back pain. He rates his pain an 8/10 at present. He mentions that pain is worsened with urination and is accompanied by lightheadedness and weakness when it becomes severe. He complains of frequent emesis over the last 2 days but has not experienced any vomiting today. He reports taking 2 Tylenol earlier today around 3:00 p.m. for pain relief. He denies hematuria, testicular pain, cough, or sore throat.  On initial workup, the patient had a CT suspicious for acute appendicitis.  He will be admitted for further management of his likely appendicitis and General Surgery consult.

## 2024-03-07 NOTE — NURSING TRANSFER
Nursing Transfer Note      3/7/2024   12:31 PM    Nurse giving handoff:ragini bedolla  Nurse receiving handoff:flaquita Peralta    Reason patient is being transferred: post op    Transfer To: room 344    Transfer via stretcher    Transfer with     Transported by rn    Transfer Vital Signs:  Blood Pressure:see flowshhet  Heart Rate:  O2:  Temperature:  Respirations:    Telemetry:   Order for Tele Monitor?     Additional Lines:     4eyes on Skin: yes    Medicines sent:     Any special needs or follow-up needed: no    Patient belongings transferred with patient: yes  Chart send with patient: Yes    Notified: no family    Patient reassessed at:  (date, time)  1  Upon arrival to floor:

## 2024-03-07 NOTE — OR NURSING
Patient Liberian speaking, speaks no English. Language line used for interpretaion by  to obtain consent.  name: Arlin, ID# [id number].

## 2024-03-07 NOTE — ED NOTES
Report given to AMBER Bahena. Pt to be transferred to ICU Room 4 via stretcher with nurse transport on continuous monitor. Pt belongings with pt on bed. Safety measures in place; side rails up x2.

## 2024-03-07 NOTE — ANESTHESIA PREPROCEDURE EVALUATION
03/07/2024  Bharat Mcdonough is a 28 y.o., male.      Pre-op Assessment    I have reviewed the Patient Summary Reports.     I have reviewed the Nursing Notes. I have reviewed the NPO Status.   I have reviewed the Medications.     Review of Systems  Anesthesia Hx:  No previous Anesthesia             Denies Family Hx of Anesthesia complications.    Denies Personal Hx of Anesthesia complications.                    Social:  Non-Smoker, Recreational Drugs THC      Hematology/Oncology:  Hematology Normal   Oncology Normal                                   Cardiovascular:  Cardiovascular Normal Exercise tolerance: good                                           Pulmonary:  Pulmonary Normal                       Renal/:  Renal/ Normal                 Hepatic/GI:  Hepatic/GI Normal                 Musculoskeletal:  Musculoskeletal Normal                Neurological:  Neurology Normal                                      Endocrine:  Endocrine Normal            Psych:  Psychiatric Normal                  Physical Exam  General: Well nourished and Cooperative    Airway:  Mallampati: I   Mouth Opening: Normal  TM Distance: Normal  Neck ROM: Normal ROM    Dental:  Intact    Anesthesia Plan  Type of Anesthesia, risks & benefits discussed:    Anesthesia Type: Gen ETT  Intra-op Monitoring Plan: Standard ASA Monitors  Post Op Pain Control Plan: multimodal analgesia  Induction:  IV  Airway Plan: Video  Informed Consent: Informed consent signed with the Patient and all parties understand the risks and agree with anesthesia plan.  All questions answered.   ASA Score: 1 Emergent  Day of Surgery Review of History & Physical: H&P Update referred to the surgeon/provider.    Ready For Surgery From Anesthesia Perspective.   .

## 2024-03-07 NOTE — PLAN OF CARE
Problem: Adult Inpatient Plan of Care  Goal: Plan of Care Review  Outcome: Ongoing, Progressing  Flowsheets (Taken 3/7/2024 1350)  Plan of Care Reviewed With: patient  Goal: Optimal Comfort and Wellbeing  Outcome: Ongoing, Progressing  Intervention: Monitor Pain and Promote Comfort  Flowsheets (Taken 3/7/2024 1350)  Pain Management Interventions:   around-the-clock dosing utilized   care clustered   quiet environment facilitated   relaxation techniques promoted   pain management plan reviewed with patient/caregiver  Intervention: Provide Person-Centered Care  Flowsheets (Taken 3/7/2024 1350)  Trust Relationship/Rapport:   care explained   choices provided   emotional support provided   empathic listening provided   questions answered   questions encouraged   reassurance provided   thoughts/feelings acknowledged     Problem: Fall Injury Risk  Goal: Absence of Fall and Fall-Related Injury  Outcome: Ongoing, Progressing  Intervention: Identify and Manage Contributors  Flowsheets (Taken 3/7/2024 1350)  Self-Care Promotion:   independence encouraged   BADL personal objects within reach   BADL personal routines maintained   safe use of adaptive equipment encouraged  Medication Review/Management: medications reviewed  Intervention: Promote Injury-Free Environment  Flowsheets (Taken 3/7/2024 1350)  Safety Promotion/Fall Prevention:   assistive device/personal item within reach   bed alarm set   nonskid shoes/socks when out of bed   /camera at bedside   instructed to call staff for mobility     Problem: Infection  Goal: Absence of Infection Signs and Symptoms  Outcome: Ongoing, Progressing  Intervention: Prevent or Manage Infection  Flowsheets (Taken 3/7/2024 1350)  Fever Reduction/Comfort Measures: fluid intake increased  Infection Management: aseptic technique maintained     Problem: Pain Acute  Goal: Acceptable Pain Control and Functional Ability  Outcome: Ongoing, Progressing  Intervention: Develop Pain  Management Plan  Flowsheets (Taken 3/7/2024 1350)  Pain Management Interventions:   around-the-clock dosing utilized   care clustered   quiet environment facilitated   relaxation techniques promoted   pain management plan reviewed with patient/caregiver  Intervention: Prevent or Manage Pain  Flowsheets (Taken 3/7/2024 1350)  Sleep/Rest Enhancement:   relaxation techniques promoted   regular sleep/rest pattern promoted  Bowel Elimination Promotion: adequate fluid intake promoted  Medication Review/Management: medications reviewed  Intervention: Optimize Psychosocial Wellbeing  Flowsheets (Taken 3/7/2024 1350)  Supportive Measures:   active listening utilized   decision-making supported   verbalization of feelings encouraged

## 2024-03-07 NOTE — OP NOTE
[facility] Intensive Care [facility]  Surgery Department  Operative Note    SUMMARY     Patient: Bharat Mcdonough    Medical Record: [id number]    Date of Procedure: 3/7/2024     Surgeon: Surgeon(s) and Role:     * Logan Morgan Jr., MD - Primary    Assisting Surgeon: None    Pre-Operative Diagnosis: Appendicitis [K37]    Post-Operative Diagnosis: Post-Op Diagnosis Codes:     * Appendicitis [K37]    Procedure: Procedure(s) (LRB):  APPENDECTOMY, LAPAROSCOPIC (N/A)    Procedure in Detail:  The patient was brought to the operating and placed in supine position.  The abdomen prepped and draped in a sterile fashion.  A small incision made at the umbilicus, a Veress needle inserted, a pneumoperitoneum achieved.  A 12 mm Optiview trocar inserted at the umbilicus.  Under direct observation 2 additional trocars were inserted:  A 5 mm trocar in the right upper quadrant, a 5 mm trocar in the left lower quadrant.  The appendix visualized and seen to be acutely inflamed with a gangrenous distal portion.  The appendix was grasped with a ratcheted retractor placed through the right upper quadrant 5 mm port.  The mesoappendix taken down using the harmonic scalpel.  The base of the appendix then transected with a linear endoscopic stapler.  The appendix removed with the aid of an Endo-Catch through the periumbilical port.  Peritoneal cavity irrigated and inspected.  The pneumoperitoneum released.  The fascia of the umbilicus closed with 0 Vicryl.  The skin with 4-0 Monocryl.  The wounds sterilely dressed.  The patient tolerated the procedure well left the operating room in good condition.  At the end of procedure all sponge lap and instrument counts were correct.  Estimated blood loss-minimal

## 2024-03-07 NOTE — PLAN OF CARE
Recommendations  1) When medically able, adv diet to general healthful diet   2) Monitor weights and labs    3) RD to monitor and follow up    Goals:   Pt will have diet adv and able to tolerate >75% intake of meals by RD follow up  Nutrition Goal Status: new  Communication of RD Recs:  (POC)

## 2024-03-07 NOTE — PLAN OF CARE
Pt febrile, temperature 100.9. General surgery consulted for appendicitis. No nausea and vomiting noted. Pain controlled. IVF infusing at prescribed rate at 125 mL/hr. IV abx continued. Fall precautions maintained. POC reviewed w/ pt through . Pt verbalized understanding.       Problem: Adult Inpatient Plan of Care  Goal: Plan of Care Review  Outcome: Ongoing, Progressing  Flowsheets (Taken 3/7/2024 0528)  Plan of Care Reviewed With: patient     Problem: Fall Injury Risk  Goal: Absence of Fall and Fall-Related Injury  Outcome: Ongoing, Progressing  Intervention: Identify and Manage Contributors  Flowsheets (Taken 3/7/2024 0528)  Self-Care Promotion:   BADL personal objects within reach   independence encouraged   BADL personal routines maintained  Medication Review/Management: medications reviewed     Problem: Infection  Goal: Absence of Infection Signs and Symptoms  Outcome: Ongoing, Progressing  Intervention: Prevent or Manage Infection  Flowsheets (Taken 3/7/2024 0528)  Fever Reduction/Comfort Measures:   lightweight bedding   lightweight clothing  Infection Management: aseptic technique maintained  Isolation Precautions:   precautions maintained   protective     Problem: Pain Acute  Goal: Acceptable Pain Control and Functional Ability  Outcome: Ongoing, Progressing  Intervention: Develop Pain Management Plan  Flowsheets (Taken 3/7/2024 0528)  Pain Management Interventions:   pillow support provided   position adjusted   relaxation techniques promoted   quiet environment facilitated   care clustered   medication offered  Intervention: Prevent or Manage Pain  Flowsheets (Taken 3/7/2024 0528)  Sleep/Rest Enhancement: awakenings minimized  Sensory Stimulation Regulation:   care clustered   lighting decreased  Medication Review/Management: medications reviewed  Intervention: Optimize Psychosocial Wellbeing  Flowsheets (Taken 3/7/2024 0528)  Supportive Measures: active listening utilized

## 2024-03-07 NOTE — ED TRIAGE NOTES
28 YOM presents to ED with c/o RLQ pain 8/10, dysuria, n/v, and generalized body aches X 3 day. Temp 101.1 all other VSS. -red tint. Denies any PMH. A&Ox4. Denies any other complaints.     LOC: The patient is awake, alert and aware of environment with an appropriate affect, the patient is oriented x 3.  APPEARANCE: Patient resting comfortably and in no acute distress, patient is clean and well groomed, patient's clothing is properly fastened.  SKIN: The skin is warm and dry, patient has normal skin turgor and moist mucus membranes, skin intact, no breakdown or brusing noted.  MUSKULOSKELETAL: Patient moving all extremities well, no obvious swelling or deformities noted.  RESPIRATORY: Airway is open and patent, respirations are spontaneous, patient has a normal effort and rate.  CARDIAC: No peripheral edema.  ABDOMEN: Soft and no tenderness to palpation, no distention noted.     ED workup in progress. VSS. Safety measures in place; side rails up x2. Call light within pt reach. Will continue to monitor.

## 2024-03-07 NOTE — ED PROVIDER NOTES
Encounter Date: 3/6/2024    SCRIBE #1 NOTE: I, Gallito Ramirez, am scribing for, and in the presence of,  Sherie Starr MD. I have scribed the entire note.       History     Chief Complaint   Patient presents with    Abdominal Pain     Pt c/o RLQ abd pain with N/V x3 days. Tender to palpation. Radiates to LLQ. Subjective fevers. Dysuria.     Time seen by provider: 7:30 PM    This is a 28 y.o. Pakistani-speaking male who presents with complaint of abdominal pain. He states that the pain began 3 days ago and is localized to his right side. He also complains of vomiting, fever and intermittent back pain. He rates his pain an 8/10 at present. He mentions that pain is worsened with urination and is accompanied by lightheadedness and weakness when it becomes severe. He complains of frequent emesis over the last 2 days but has not experienced any vomiting today. He reports taking 2 Tylenol earlier today around 3:00 p.m. for pain relief.  He denies hematuria, testicular pain, cough, or sore throat. He denies recent sexual encounters, tobacco use, or alcohol consumption. He occasionally smokes marijuana. This is the extent of the patient's complaints at this time. He denies medical problems or previous medical history.    The history is provided by the patient. The history is limited by a language barrier. A  was used.     Review of patient's allergies indicates:  No Known Allergies  History reviewed. No pertinent past medical history.  Past Surgical History:   Procedure Laterality Date    LAPAROSCOPIC APPENDECTOMY N/A 3/7/2024    Procedure: APPENDECTOMY, LAPAROSCOPIC;  Surgeon: Logan Morgan Jr., MD;  Location: [facility];  Service: General;  Laterality: N/A;     History reviewed. No pertinent family history.  Social History     Tobacco Use    Smoking status: Never   Substance Use Topics    Alcohol use: Not Currently    Drug use: Yes     Types: Marijuana     Review of Systems   Constitutional:  Positive for  fever. Negative for chills.   HENT:  Negative for congestion and sore throat.    Eyes:  Negative for visual disturbance.   Respiratory:  Negative for cough and shortness of breath.    Cardiovascular:  Negative for chest pain and palpitations.   Gastrointestinal:  Positive for abdominal pain, nausea and vomiting. Negative for diarrhea.   Genitourinary:  Positive for difficulty urinating and dysuria. Negative for decreased urine volume, frequency and hematuria.   Musculoskeletal:  Positive for back pain. Negative for joint swelling, neck pain and neck stiffness.   Skin:  Negative for rash and wound.   Neurological:  Positive for light-headedness. Negative for weakness, numbness and headaches.   Psychiatric/Behavioral:  Negative for behavioral problems and confusion.        Physical Exam     Initial Vitals [03/06/24 1910]   BP Pulse Resp Temp SpO2   (!) 143/86 (!) 112 17 (!) 101.1 °F (38.4 °C) 99 %      MAP       --         Physical Exam    Constitutional: He appears well-developed and well-nourished.   HENT:   Head: Normocephalic and atraumatic.   Nose: Nose normal.   Mouth/Throat: Oropharynx is clear and moist.   Eyes: Conjunctivae and EOM are normal. Pupils are equal, round, and reactive to light.   Neck: Neck supple.   Normal range of motion.  Cardiovascular:  Normal rate and regular rhythm.     Exam reveals no gallop and no friction rub.       No murmur heard.  Pulmonary/Chest: Breath sounds normal. No respiratory distress. He has no wheezes. He has no rales.   Abdominal: Abdomen is soft. Bowel sounds are normal. There is generalized abdominal tenderness and tenderness in the right lower quadrant and suprapubic area. There is no rebound and no guarding.   Musculoskeletal:         General: No tenderness or edema.      Cervical back: Normal range of motion and neck supple.     Neurological: He is alert and oriented to person, place, and time. He has normal strength. No cranial nerve deficit or sensory deficit. Gait  normal. GCS score is 15. GCS eye subscore is 4. GCS verbal subscore is 5. GCS motor subscore is 6.   Skin: Skin is warm and dry. No rash noted.   Psychiatric: He has a normal mood and affect. His speech is normal and behavior is normal.         ED Course   Procedures  Labs Reviewed   CBC W/ AUTO DIFFERENTIAL - Abnormal; Notable for the following components:       Result Value    WBC 14.46 (*)     MPV 8.7 (*)     Gran # (ANC) 12.6 (*)     Immature Grans (Abs) 0.07 (*)     Lymph # 0.8 (*)     Gran % 87.3 (*)     Lymph % 5.4 (*)     All other components within normal limits    Narrative:     Release to patient->Immediate   HIV 1 / 2 ANTIBODY    Narrative:     Release to patient->Immediate   HEPATITIS C ANTIBODY    Narrative:     Release to patient->Immediate   COMPREHENSIVE METABOLIC PANEL    Narrative:     Release to patient->Immediate   URINALYSIS, REFLEX TO URINE CULTURE    Narrative:     Specimen Source->Urine   LIPASE    Narrative:     Release to patient->Immediate   LACTIC ACID, PLASMA   LACTIC ACID, PLASMA          Imaging Results               CT Abdomen Pelvis  Without Contrast (Final result)  Result time 03/07/24 01:22:24      Final result by Brian Brothers MD (03/07/24 01:22:24)                   Impression:      Suspected dilated appendix arising from the posteromedial cecum with adjacent fat stranding and ill-defined fluid suspicious for acute appendicitis.    Previously noted mild wall thickening of the terminal ileum appears somewhat less conspicuous.  Mildly dilated loops of small bowel again identified within the lower abdomen and pelvis without discrete transition point which may reflect delayed transit/ileus secondary to pelvic inflammation.  A nonspecific distal enteritis is felt less likely.    Additional findings as above.    This report was flagged in Epic as abnormal.      Electronically signed by: Brian Brothers MD  Date:    03/07/2024  Time:    01:22               Narrative:     EXAMINATION:  CT ABDOMEN PELVIS WITHOUT CONTRAST    CLINICAL HISTORY:  RLQ abdominal pain (Age >= 14y);    TECHNIQUE:  Low dose axial images, sagittal and coronal reformations were obtained from the lung bases to the pubic symphysis without IV contrast.  30 mL of oral Omnipaque 350 was administered.    COMPARISON:  CT 03/06/2024    FINDINGS:  The lung bases are unremarkable.  There is no pleural fluid present.  The visualized portions of the heart appear normal.    No focal hepatic abnormality identified on this limited noncontrast examination.  The gallbladder shows no evidence of stones or pericholecystic fluid.  There is no intra-or extrahepatic biliary ductal dilatation.    Stomach is relatively nondistended.  The spleen, pancreas, and adrenal glands appear within normal limits for noncontrast technique.    Kidneys are normal in size and location.  There is residual contrast material within the renal collecting systems and ureters from prior CT examination.  There is no significant hydronephrosis.  Ureters appear normal in course and caliber.  Urinary bladder appears within normal limits.  Prostate is unremarkable.    The abdominal aorta is normal in course and caliber without significant atherosclerotic calcifications.    On today's follow-up examination, oral contrast material is present within the mid and distal small bowel, although has not yet reached the colon.  There is redemonstration of mild localized fat stranding and ill-defined fluid within the right lower quadrant.  A suspected dilated appendix is identified arising from the posteromedial aspect of the cecum measuring up to 1.1 cm at the level of the distal appendix (for example axial series 2, image 118).  There is adjacent induration of the fat.  Constellation of findings is concerning for acute appendicitis.  Previously noted wall thickening of the terminal ileum appears mildly decreased in conspicuity from prior examination which may relate to  improved distension versus lack of IV contrast.  Oral contrast material is present within loops of mildly prominent small bowel without discrete transition point.  This may reflect delayed transit/ileus secondary to pelvic inflammation.  No extraluminal oral contrast material identified.  There is retained stool throughout the colon.  There is no free intraperitoneal air or portal venous gas.  There are scattered shotty mesenteric lymph nodes.    Osseous structures demonstrate no acute abnormality.  The extraperitoneal soft tissues are unremarkable.                                        CT Abdomen Pelvis With IV Contrast NO Oral Contrast (Final result)  Result time 03/06/24 21:48:50      Final result by Brian Brothers MD (03/06/24 21:48:50)                   Impression:      Focal fat stranding and ill-defined fluid within the right lower quadrant with possible thick-walled enhancing appendix identified as detailed above.  Additionally, there is mild wall thickening of the terminal ileum with mildly prominent fluid-filled loops of small bowel in the pelvis.  Findings could represent acute appendicitis with associated reactive wall thickening of the terminal ileum and delayed small bowel transit.  In the absence of acute appendicitis, this could reflect a nonspecific terminal ileitis.  Clinical correlation with physical examination and appropriate lab values advised.  Repeat examination with oral contrast could be performed for better delineation of the appendix as clinically warranted.    This report was flagged in Epic as abnormal.      Electronically signed by: Brian Brothers MD  Date:    03/06/2024  Time:    21:48               Narrative:    EXAMINATION:  CT ABDOMEN PELVIS WITH IV CONTRAST    CLINICAL HISTORY:  Abdominal abscess/infection suspected;    TECHNIQUE:  Low dose axial images, sagittal and coronal reformations were obtained from the lung bases to the pubic symphysis following the IV  administration of 75 mL of Omnipaque 350 .  Oral contrast was not given.    COMPARISON:  None.    FINDINGS:  The lung bases are unremarkable.  There is no pleural fluid present.  The visualized portions of the heart appear normal.    The liver is normal in size and attenuation with no focal hepatic abnormality.  The gallbladder shows no evidence of stones or pericholecystic fluid.  There is no intra-or extrahepatic biliary ductal dilatation.    The stomach, spleen, pancreas, and adrenal glands are unremarkable.    The kidneys are normal in size and location and enhance symmetrically.  There is no evidence of hydronephrosis. The urinary bladder is unremarkable. The prostate demonstrates no significant abnormality.    The abdominal aorta is normal in course and caliber without significant atherosclerotic calcifications.    There is mild focal fat stranding and ill-defined fluid identified within the right lower quadrant.  Terminal ileum demonstrates mild wall thickening and there are mildly prominent fluid-filled loops of small bowel within the pelvis.  There is a thick-walled enhancing tubular structure identified along the posteromedial aspect of the cecum which may represent a mildly dilated appendix (for example axial series 2, image 115 and coronal series 601, image 54).  Findings could reflect acute appendicitis with associated reactive wall thickening of the terminal ileum and delayed distal small-bowel transit versus a nonspecific terminal ileitis.  There is a small volume of free fluid within the pelvis without discrete drainable fluid collection.  There is no free intraperitoneal air.  There is mild scattered retained stool throughout the colon.  There are few scattered shotty mesenteric lymph nodes.    When viewed with bone windows the osseous structures are unremarkable.  The extraperitoneal soft tissues are unremarkable.                                       Medications   meperidine (PF) injection 12.5 mg  (has no administration in time range)   sodium chloride 0.9% bolus 1,000 mL 1,000 mL (0 mLs Intravenous Stopped 3/6/24 2043)   ketorolac injection 15 mg (15 mg Intravenous Given 3/6/24 1938)   HYDROmorphone injection 0.5 mg (0.5 mg Intravenous Given 3/6/24 1939)   ondansetron injection 4 mg (4 mg Intravenous Given 3/6/24 1939)   acetaminophen tablet 1,000 mg (1,000 mg Oral Given 3/6/24 2132)   iohexoL (OMNIPAQUE 350) injection 75 mL (75 mLs Intravenous Given 3/6/24 2102)   sodium chloride 0.9% bolus 1,000 mL 1,000 mL (0 mLs Intravenous Stopped 3/6/24 2227)   piperacillin-tazobactam (ZOSYN) 4.5 g in dextrose 5 % in water (D5W) 100 mL IVPB (MB+) (0 g Intravenous Stopped 3/6/24 2207)   HYDROmorphone injection 0.5 mg (0.5 mg Intravenous Given 3/6/24 2237)   iohexoL (OMNIPAQUE 9) oral solution 1,000 mL (1,000 mLs Oral Given 3/7/24 0029)     Medical Decision Making  Emergent evaluation a 28-year-old male who presents with complaint of abdominal pain and fever.  He had vomiting the last 2 days but none today.  There is associated urinary symptoms.  On exam he is tender in the suprapubic area and right lower quadrant.  I am suspicious for appendicitis with or without perforation versus infected kidney stone or pyelonephritis given his symptoms and exam.    Will perform labs and urinalysis.  CT with or without contrast depending on urinalysis results.    Amount and/or Complexity of Data Reviewed  Labs: ordered. Decision-making details documented in ED Course.  Radiology: ordered and independent interpretation performed. Decision-making details documented in ED Course.    Risk  OTC drugs.  Prescription drug management.  Decision regarding hospitalization.            Scribe Attestation:   Scribe #1: I performed the above scribed service and the documentation accurately describes the services I performed. I attest to the accuracy of the note.    Physician Attestation for Scribe: I, Sherie Starr, reviewed documentation as  scribed in my presence, which is both accurate and complete.      ED Course as of 03/11/24 0637   Wed Mar 06, 2024   1958 CBC auto differential(!)  Reviewed.  Significant leukocytosis present.  No anemia. [AK]   2021 Lactic acid, plasma  Reviewed and within normal limits. [AK]   2022 Lipase  Reviewed and within normal limits. [AK]   2022 Comprehensive metabolic panel  Reviewed and within normal limits.  No electrolyte disturbance or azotemia, normal LFTs. [AK]   2048 Urinalysis, Reflex to Urine Culture Urine, Clean Catch  Reviewed and within normal limits. [AK]   2048 Hepatitis C Antibody  Reviewed and negative. [AK]   2048 HIV 1/2 Ag/Ab (4th Gen)  Reviewed and negative. [AK]   2048 Awaiting CT abdomen/pelvis at shift change. [AK]   2117 Received sign-out from off going provider, pt pending ct- abdomen and pelvis with contrast with anticipated discharge pending imaging.  It was per plan of off going, previous provider pending imaging that the pt may require a dose of IV antibiotics or d/c'ed home with outpatient follow up pending Po challenge and improvement in vitals.  [HM]   2123 Pt spiked another fever and became tachycardic after returning from CT, will give previously scheduled Tylenol, will add on a L a total 30 cc/kg fluid bolus and Zosyn for antibiotic coverage. [HM]   2212 Spoke with Dr. Morgan when imaging returned, he notes concern for appendicitis and bowel obstruction UTI abdomen and pelvis with oral contrast which was verified with radiologist (Dr. Brian Brothers) on-call and ordered.   [HM]   2244 Updated general surgery with timeframe as Radiology states that it could take up to 2.5-3 hours for patient to be ready for oral contrasted imaging, GS aware and agreeable with waiting [HM]   u Mar 07, 2024   0046 Pt noted to have a repeat fever will reduce anti pyretics and continue to monitor [HM]   0128 Dr. Morgan updated on findings of repeat ct with oral contrast with raised concern for appendicitis  over bowel obstruction and ongoing fevers recommends admission to medicine with surgical consultation in the morning.  He recommended the patient be made NPO and admitted to Hospital Medicine. Pt updated. Contacted  who was agreeable with admission. Pt deemed stable for admission to medicine.  []      ED Course User Index  [AK] Sherie Starr MD  [] Marshall Pino MD               Medical Decision Making:   Differential Diagnosis:   Includes but not limited to:  Colitis, diverticulitis, appendicitis with or without perforation, infected kidney stone, gastroenteritis, cholecystitis, pancreatitis, pyelonephritis             Clinical Impression:  Final diagnoses:  [K37] Appendicitis  [K35.80] Acute appendicitis          ED Disposition Condition    Admit                 Sherie Starr MD  03/11/24 0637

## 2024-03-07 NOTE — ANESTHESIA POSTPROCEDURE EVALUATION
Anesthesia Post Evaluation    Patient: Bharat Mcdonough    Procedure(s) Performed: Procedure(s) (LRB):  APPENDECTOMY, LAPAROSCOPIC (N/A)    Final Anesthesia Type: general      Patient location during evaluation: PACU  Patient participation: Yes- Able to Participate  Level of consciousness: awake and alert  Post-procedure vital signs: reviewed and stable  Pain management: adequate  Airway patency: patent    PONV status at discharge: No PONV  Anesthetic complications: no      Cardiovascular status: blood pressure returned to baseline  Respiratory status: spontaneous ventilation  Hydration status: euvolemic  Follow-up not needed.          Vitals Value Taken Time   /60 03/07/24 1231   Temp 36.5 °C (97.7 °F) 03/07/24 1230   Pulse 84 03/07/24 1243   Resp 17 03/07/24 1243   SpO2 100 % 03/07/24 1243   Vitals shown include unvalidated device data.      Event Time   Out of Recovery 03/07/2024 12:44:53         Pain/Jaiden Score: Pain Rating Prior to Med Admin: 5 (3/7/2024 12:14 PM)  Pain Rating Post Med Admin: 0 (3/7/2024 12:27 PM)  Jaiden Score: 10 (3/7/2024 12:27 PM)

## 2024-03-07 NOTE — ANESTHESIA PROCEDURE NOTES
Peripheral Block    Patient location during procedure: pre-op   Block not for primary anesthetic.  Reason for block: at surgeon's request and post-op pain management   Post-op Pain Location: abdominal wall pain    End time: 3/7/2024 10:47 AM    Staffing  Authorizing Provider: Titus Escamilla MD  Performing Provider: Titus Escamilla MD    Staffing  Performed by: Titus Escamilla MD  Authorized by: Titus Escamilla MD    Preanesthetic Checklist  Completed: patient identified, IV checked, site marked, risks and benefits discussed, surgical consent, monitors and equipment checked, pre-op evaluation and timeout performed  Peripheral Block  Patient position: supine  Prep: ChloraPrep  Patient monitoring: cardiac monitor, heart rate, continuous pulse ox, continuous capnometry and frequent blood pressure checks  Block type: TAP  Laterality: bilateral  Injection technique: single shot  Needle  Needle type: Stimuplex   Needle gauge: 21 G  Needle length: 4 in  Needle localization: ultrasound guidance   -ultrasound image captured on disc.  Assessment  Injection assessment: negative aspiration, negative parasthesia and local visualized surrounding nerve  Paresthesia pain: none  Heart rate change: no  Slow fractionated injection: yes    Medications:    Medications: bupivacaine (pf) (MARCAINE) injection 0.25% - Perineural   40 mL - 3/7/2024 10:47:00 AM    Additional Notes  VSS.  [facility] RN monitoring vitals throughout procedure.  Patient tolerated procedure well.

## 2024-03-07 NOTE — NURSING TRANSFER
Nursing Transfer Note      3/7/2024   10:03 AM    1ST Attempt to give report @ 0945.  2nd Attempt to give Report @1008.  3rd attempt to give report @1015 was by charge nurse Theodora.   Report was given @ 1042.       Nurse giving handoff: AMBER Andersen  Nurse receiving handoff:AMBER Peralta    Pt is currently in OR for surgical procedure and will be transferred by OR team to [address] [place] [address] after the procedure is completed.     Reason patient is being transferred: Pt is a stepdown patient no longer requiring ICU.    Transfer To: [address] [place] [address]    Transfer via bed    Transfer with cardiac monitoring    Transported by OR    Transfer Vital Signs:  Blood Pressure: 125/89  Heart Rate: 100  O2:98  Temperature: 98.7  Respirations:18    Order for Tele Monitor? No    Patient belongings transferred with patient: Yes    Chart send with patient: Yes

## 2024-03-07 NOTE — CONSULTS
[facility] - Intensive Care ([place])  Consult Note      Date of Consultation:3/7/2024    Reason for Consult:  Acute appendicitis  SUBJECTIVE:     History of Present Illness:  28-year-old Guamanian-speaking male who presented to the emergency room with a 4 day history of nausea, vomiting and right lower quadrant pain.  The patient's past medical history is unremarkable.  Positive fever, no diarrhea, constipation, weight loss.  No previous abdominal surgery.  CT scan of the abdomen showed acute appendicitis possible ileitis.    History reviewed. No pertinent past medical history.  History reviewed. No pertinent surgical history.  History reviewed. No pertinent family history.  Social History     Tobacco Use    Smoking status: Never   Substance Use Topics    Alcohol use: Not Currently    Drug use: Yes     Types: Marijuana       Current Facility-Administered Medications:     0.9%  NaCl infusion, , Intravenous, Continuous, Manjeet Ng NP, Last Rate: 125 mL/hr at 03/07/24 0238, New Bag at 03/07/24 0238    acetaminophen tablet 650 mg, 650 mg, Oral, Q4H PRN, Manjeet Ng, NP, 650 mg at 03/07/24 0222    dextrose 10% bolus 125 mL 125 mL, 12.5 g, Intravenous, PRN, Manjeet Ng, NP    dextrose 10% bolus 250 mL 250 mL, 25 g, Intravenous, PRN, Manjeet Ng, NP    glucagon (human recombinant) injection 1 mg, 1 mg, Intramuscular, PRN, Manjeet Ng, NP    glucose chewable tablet 16 g, 16 g, Oral, PRN, Manjeet Ng, NP    glucose chewable tablet 24 g, 24 g, Oral, PRN, Manjeet Ng, NP    morphine injection 2 mg, 2 mg, Intravenous, Q4H PRN, Manjeet Ng, NP, 2 mg at 03/07/24 0731    naloxone 0.4 mg/mL injection 0.02 mg, 0.02 mg, Intravenous, PRN, Manjeet Ng, NP    ondansetron injection 4 mg, 4 mg, Intravenous, Q8H PRN, Manjeet Ng, NP    piperacillin-tazobactam (ZOSYN) 4.5 g in dextrose 5 % in water (D5W) 100 mL IVPB (MB+), 4.5 g, Intravenous, Q8H, Hernandez,  Manjeet GOODMAN NP, Last Rate: 25 mL/hr at 03/07/24 0524, 4.5 g at 03/07/24 0524    sodium chloride 0.9% flush 10 mL, 10 mL, Intravenous, Q8H PRN, Manjeet Ng NP     Review of patient's allergies indicates:  No Known Allergies    Review of Systems:  Review of Systems   Constitutional: Negative.  Negative for fatigue and fever.   HENT: Negative.  Negative for nosebleeds, sore throat and trouble swallowing.    Eyes: Negative.    Respiratory: Negative.  Negative for cough, shortness of breath and wheezing.    Cardiovascular: Negative.  Negative for chest pain.   Gastrointestinal:  Positive for abdominal distention, abdominal pain, nausea and vomiting. Negative for constipation and diarrhea.   Genitourinary:  Negative for difficulty urinating, frequency, hematuria and urgency.   Musculoskeletal: Negative.    Skin: Negative.    Neurological: Negative.    Psychiatric/Behavioral: Negative.          Current Facility-Administered Medications   Medication    0.9%  NaCl infusion    acetaminophen tablet 650 mg    dextrose 10% bolus 125 mL 125 mL    dextrose 10% bolus 250 mL 250 mL    glucagon (human recombinant) injection 1 mg    glucose chewable tablet 16 g    glucose chewable tablet 24 g    morphine injection 2 mg    naloxone 0.4 mg/mL injection 0.02 mg    ondansetron injection 4 mg    piperacillin-tazobactam (ZOSYN) 4.5 g in dextrose 5 % in water (D5W) 100 mL IVPB (MB+)    sodium chloride 0.9% flush 10 mL       OBJECTIVE:     Physical Exam:  Physical Exam  Constitutional:       Appearance: He is well-developed.   HENT:      Head: Normocephalic and atraumatic.      Right Ear: External ear normal.      Left Ear: External ear normal.   Eyes:      Pupils: Pupils are equal, round, and reactive to light.   Cardiovascular:      Rate and Rhythm: Normal rate and regular rhythm.      Heart sounds: Normal heart sounds.   Pulmonary:      Breath sounds: Normal breath sounds.   Abdominal:      General: Bowel sounds are normal.       "Palpations: Abdomen is soft. There is no mass.      Tenderness: There is abdominal tenderness. There is guarding. There is no rebound.      Comments: Tenderness to direct palpation with guarding right lower quadrant.   Musculoskeletal:         General: Normal range of motion.      Cervical back: Neck supple.   Skin:     General: Skin is warm and dry.   Neurological:      Mental Status: He is alert and oriented to person, place, and time.   Psychiatric:         Mood and Affect: Mood normal.         Behavior: Behavior normal.         Thought Content: Thought content normal.         Judgment: Judgment normal.            Laboratory:  Recent Labs   Lab 03/07/24 0422   WBC 17.32*   RBC 4.33*   HGB 13.3*   HCT 38.3*      MCV 89   MCH 30.7   MCHC 34.7     BMP:   Recent Labs   Lab 03/07/24 0422   *      K 3.8      CO2 25   BUN 7   CREATININE 0.9   CALCIUM 8.5*   MG 1.6   PHOS 3.7     Lab Results   Component Value Date    CALCIUM 8.5 (L) 03/07/2024    PHOS 3.7 03/07/2024     BNP  No results for input(s): "BNP", "BNPTRIAGEBLO" in the last 168 hours.No results found for: "URICACID"No results found for: "IRON", "TIBC", "FERRITIN", "SATURATEDIRO"  Lab Results   Component Value Date    CALCIUM 8.5 (L) 03/07/2024    PHOS 3.7 03/07/2024       Diagnostic Results:  CT Abdomen Pelvis  Without Contrast  Narrative: EXAMINATION:  CT ABDOMEN PELVIS WITHOUT CONTRAST    CLINICAL HISTORY:  RLQ abdominal pain (Age >= 14y);    TECHNIQUE:  Low dose axial images, sagittal and coronal reformations were obtained from the lung bases to the pubic symphysis without IV contrast.  30 mL of oral Omnipaque 350 was administered.    COMPARISON:  CT 03/06/2024    FINDINGS:  The lung bases are unremarkable.  There is no pleural fluid present.  The visualized portions of the heart appear normal.    No focal hepatic abnormality identified on this limited noncontrast examination.  The gallbladder shows no evidence of stones or " pericholecystic fluid.  There is no intra-or extrahepatic biliary ductal dilatation.    Stomach is relatively nondistended.  The spleen, pancreas, and adrenal glands appear within normal limits for noncontrast technique.    Kidneys are normal in size and location.  There is residual contrast material within the renal collecting systems and ureters from prior CT examination.  There is no significant hydronephrosis.  Ureters appear normal in course and caliber.  Urinary bladder appears within normal limits.  Prostate is unremarkable.    The abdominal aorta is normal in course and caliber without significant atherosclerotic calcifications.    On today's follow-up examination, oral contrast material is present within the mid and distal small bowel, although has not yet reached the colon.  There is redemonstration of mild localized fat stranding and ill-defined fluid within the right lower quadrant.  A suspected dilated appendix is identified arising from the posteromedial aspect of the cecum measuring up to 1.1 cm at the level of the distal appendix (for example axial series 2, image 118).  There is adjacent induration of the fat.  Constellation of findings is concerning for acute appendicitis.  Previously noted wall thickening of the terminal ileum appears mildly decreased in conspicuity from prior examination which may relate to improved distension versus lack of IV contrast.  Oral contrast material is present within loops of mildly prominent small bowel without discrete transition point.  This may reflect delayed transit/ileus secondary to pelvic inflammation.  No extraluminal oral contrast material identified.  There is retained stool throughout the colon.  There is no free intraperitoneal air or portal venous gas.  There are scattered shotty mesenteric lymph nodes.    Osseous structures demonstrate no acute abnormality.  The extraperitoneal soft tissues are unremarkable.  Impression: Suspected dilated appendix  arising from the posteromedial cecum with adjacent fat stranding and ill-defined fluid suspicious for acute appendicitis.    Previously noted mild wall thickening of the terminal ileum appears somewhat less conspicuous.  Mildly dilated loops of small bowel again identified within the lower abdomen and pelvis without discrete transition point which may reflect delayed transit/ileus secondary to pelvic inflammation.  A nonspecific distal enteritis is felt less likely.    Additional findings as above.    This report was flagged in Epic as abnormal.    Electronically signed by: Brian Brothers MD  Date:    03/07/2024  Time:    01:22      IMPRESSION:  Acute appendicitis    Plan:  Patient examined with Sonu  present on computer.  Consents for appendectomy reviewed /signed with patient and     Thank you for the opportunity of seeing Bharat Mcdonough in consultation

## 2024-03-07 NOTE — H&P
[facility] ([facility]  History & Physical    Patient Name: Bharat Mcdonough  MRN: [id number]  Patient Class: IP- Inpatient  Admission Date: 3/6/2024  Attending Physician: Shruthi Barone MD   Primary Care Provider: Tonya Primary Doctor         Patient information was obtained from patient, past medical records, and ER records.     Subjective:     Principal Problem:Acute appendicitis with localized peritonitis    Chief Complaint:   Chief Complaint   Patient presents with    Abdominal Pain     Pt c/o RLQ abd pain with N/V x3 days. Tender to palpation. Radiates to LLQ. Subjective fevers. Dysuria.        HPI: The patient is a 28 y.o. male with no significant past medical history who presents with complaint of abdominal pain. He states that the pain began 3 days ago and is localized to his right side. He also complains of vomiting, fever and intermittent back pain. He rates his pain an 8/10 at present. He mentions that pain is worsened with urination and is accompanied by lightheadedness and weakness when it becomes severe. He complains of frequent emesis over the last 2 days but has not experienced any vomiting today. He reports taking 2 Tylenol earlier today around 3:00 p.m. for pain relief. He denies hematuria, testicular pain, cough, or sore throat.  On initial workup, the patient had a CT suspicious for acute appendicitis.  He will be admitted for further management of his likely appendicitis and General Surgery consult.    No past medical history on file.    No past surgical history on file.    Review of patient's allergies indicates:  No Known Allergies    No current facility-administered medications on file prior to encounter.     Current Outpatient Medications on File Prior to Encounter   Medication Sig    ibuprofen (ADVIL,MOTRIN) 800 MG tablet Take 1 tablet (800 mg total) by mouth every 6 (six) hours as needed for Pain.     Family History    None       Tobacco Use     Smoking status: Never    Smokeless tobacco: Not on file   Substance and Sexual Activity    Alcohol use: Not Currently    Drug use: Yes     Types: Marijuana    Sexual activity: Not on file     Review of Systems   Constitutional:  Positive for fever. Negative for activity change, appetite change and fatigue.   HENT:  Negative for congestion, ear pain and postnasal drip.    Eyes:  Negative for discharge.   Respiratory:  Negative for apnea, shortness of breath and wheezing.    Cardiovascular:  Negative for chest pain and leg swelling.   Gastrointestinal:  Positive for abdominal pain, nausea and vomiting. Negative for abdominal distention.   Endocrine: Negative for polydipsia, polyphagia and polyuria.   Genitourinary:  Negative for difficulty urinating, flank pain, frequency, hematuria and urgency.   Musculoskeletal:  Negative for arthralgias and joint swelling.   Skin:  Negative for pallor and rash.   Allergic/Immunologic: Negative for environmental allergies and food allergies.   Neurological:  Negative for dizziness, speech difficulty, weakness, light-headedness and headaches.   Hematological:  Does not bruise/bleed easily.   Psychiatric/Behavioral:  Negative for agitation.      Objective:     Vital Signs (Most Recent):  Temp: (!) 100.5 °F (38.1 °C) (03/07/24 0222)  Pulse: 109 (03/07/24 0132)  Resp: (!) 25 (03/07/24 0132)  BP: 111/70 (03/07/24 0132)  SpO2: 97 % (03/07/24 0132) Vital Signs (24h Range):  Temp:  [99.6 °F (37.6 °C)-102.8 °F (39.3 °C)] 100.5 °F (38.1 °C)  Pulse:  [109-129] 109  Resp:  [17-25] 25  SpO2:  [96 %-99 %] 97 %  BP: (111-151)/(64-92) 111/70     Weight: 63.5 kg (140 lb)  Body mass index is 23.9 kg/m².     Physical Exam  Constitutional:       Appearance: Normal appearance. He is well-developed.   HENT:      Head: Normocephalic.   Eyes:      Conjunctiva/sclera: Conjunctivae normal.   Cardiovascular:      Rate and Rhythm: Regular rhythm. Tachycardia present.      Pulses:           Radial pulses are 2+  on the right side and 2+ on the left side.      Heart sounds: Normal heart sounds.   Pulmonary:      Effort: Pulmonary effort is normal. Tachypnea present.      Breath sounds: Normal breath sounds.   Abdominal:      General: Bowel sounds are decreased. There is no distension.      Palpations: Abdomen is soft.      Tenderness: There is abdominal tenderness in the right lower quadrant.   Musculoskeletal:         General: Normal range of motion.      Cervical back: Normal range of motion and neck supple.   Skin:     General: Skin is warm and dry.   Neurological:      Mental Status: He is alert and oriented to person, place, and time.      GCS: GCS eye subscore is 4. GCS verbal subscore is 5. GCS motor subscore is 6.      Motor: Motor function is intact.   Psychiatric:         Mood and Affect: Mood normal.         Speech: Speech normal.         Behavior: Behavior normal.                Significant Labs: All pertinent labs within the past 24 hours have been reviewed.  CBC:   Recent Labs   Lab 03/06/24 1942   WBC 14.46*   HGB 14.9   HCT 43.0        CMP:   Recent Labs   Lab 03/06/24 1942      K 3.6   CL 98   CO2 28      BUN 7   CREATININE 1.1   CALCIUM 9.6   PROT 7.9   ALBUMIN 4.2   BILITOT 0.6   ALKPHOS 70   AST 16   ALT 13   ANIONGAP 11       Significant Imaging: I have reviewed all pertinent imaging results/findings within the past 24 hours.  Assessment/Plan:     * Acute appendicitis with localized peritonitis  CT- Suspected dilated appendix arising from the posteromedial cecum with adjacent fat stranding and ill-defined fluid suspicious for acute appendicitis.  Previously noted mild wall thickening of the terminal ileum appears somewhat less conspicuous.  Mildly dilated loops of small bowel again identified within the lower abdomen and pelvis without discrete transition point which may reflect delayed transit/ileus secondary to pelvic inflammation.  A nonspecific distal enteritis is felt less  likely.    Consult General Surgery  Zosyn  Morphine/Zofran  IVF  NPO        VTE Risk Mitigation (From admission, onward)           Ordered     Reason for No Pharmacological VTE Prophylaxis  Once        Question:  Reasons:  Answer:  Risk of Bleeding    03/07/24 0200     IP VTE LOW RISK PATIENT  Once         03/07/24 0200     Place sequential compression device  Until discontinued         03/07/24 0200                                    Manjeet Ng NP  Department of Hospital Medicine  [facility] - Intensive Care ([place])

## 2024-03-08 LAB
ALBUMIN SERPL BCP-MCNC: 3.3 G/DL (ref 3.5–5.2)
ALP SERPL-CCNC: 70 U/L (ref 55–135)
ALT SERPL W/O P-5'-P-CCNC: 11 U/L (ref 10–44)
ANION GAP SERPL CALC-SCNC: 11 MMOL/L (ref 8–16)
AST SERPL-CCNC: 14 U/L (ref 10–40)
BASOPHILS # BLD AUTO: 0.02 K/UL (ref 0–0.2)
BASOPHILS NFR BLD: 0.1 % (ref 0–1.9)
BILIRUB SERPL-MCNC: 0.9 MG/DL (ref 0.1–1)
BUN SERPL-MCNC: 9 MG/DL (ref 6–20)
CALCIUM SERPL-MCNC: 9.2 MG/DL (ref 8.7–10.5)
CHLORIDE SERPL-SCNC: 104 MMOL/L (ref 95–110)
CO2 SERPL-SCNC: 23 MMOL/L (ref 23–29)
CREAT SERPL-MCNC: 0.9 MG/DL (ref 0.5–1.4)
DIFFERENTIAL METHOD BLD: ABNORMAL
EOSINOPHIL # BLD AUTO: 0 K/UL (ref 0–0.5)
EOSINOPHIL NFR BLD: 0 % (ref 0–8)
ERYTHROCYTE [DISTWIDTH] IN BLOOD BY AUTOMATED COUNT: 12.1 % (ref 11.5–14.5)
EST. GFR  (NO RACE VARIABLE): >60 ML/MIN/1.73 M^2
GLUCOSE SERPL-MCNC: 106 MG/DL (ref 70–110)
HCT VFR BLD AUTO: 35.5 % (ref 40–54)
HGB BLD-MCNC: 12.1 G/DL (ref 14–18)
IMM GRANULOCYTES # BLD AUTO: 0.08 K/UL (ref 0–0.04)
IMM GRANULOCYTES NFR BLD AUTO: 0.5 % (ref 0–0.5)
LYMPHOCYTES # BLD AUTO: 0.9 K/UL (ref 1–4.8)
LYMPHOCYTES NFR BLD: 5.9 % (ref 18–48)
MAGNESIUM SERPL-MCNC: 1.8 MG/DL (ref 1.6–2.6)
MCH RBC QN AUTO: 30.5 PG (ref 27–31)
MCHC RBC AUTO-ENTMCNC: 34.1 G/DL (ref 32–36)
MCV RBC AUTO: 89 FL (ref 82–98)
MONOCYTES # BLD AUTO: 1 K/UL (ref 0.3–1)
MONOCYTES NFR BLD: 6.2 % (ref 4–15)
NEUTROPHILS # BLD AUTO: 13.5 K/UL (ref 1.8–7.7)
NEUTROPHILS NFR BLD: 87.3 % (ref 38–73)
NRBC BLD-RTO: 0 /100 WBC
PHOSPHATE SERPL-MCNC: 3.6 MG/DL (ref 2.7–4.5)
PLATELET # BLD AUTO: 227 K/UL (ref 150–450)
PMV BLD AUTO: 9.3 FL (ref 9.2–12.9)
POTASSIUM SERPL-SCNC: 3.7 MMOL/L (ref 3.5–5.1)
PROT SERPL-MCNC: 6.9 G/DL (ref 6–8.4)
RBC # BLD AUTO: 3.97 M/UL (ref 4.6–6.2)
SODIUM SERPL-SCNC: 138 MMOL/L (ref 136–145)
WBC # BLD AUTO: 15.43 K/UL (ref 3.9–12.7)

## 2024-03-08 PROCEDURE — 36415 COLL VENOUS BLD VENIPUNCTURE: CPT | Performed by: SPECIALIST

## 2024-03-08 PROCEDURE — 85025 COMPLETE CBC W/AUTO DIFF WBC: CPT | Performed by: SPECIALIST

## 2024-03-08 PROCEDURE — 63600175 PHARM REV CODE 636 W HCPCS: Performed by: SPECIALIST

## 2024-03-08 PROCEDURE — 83735 ASSAY OF MAGNESIUM: CPT | Performed by: SPECIALIST

## 2024-03-08 PROCEDURE — 84100 ASSAY OF PHOSPHORUS: CPT | Performed by: SPECIALIST

## 2024-03-08 PROCEDURE — 80053 COMPREHEN METABOLIC PANEL: CPT | Performed by: SPECIALIST

## 2024-03-08 PROCEDURE — 25000003 PHARM REV CODE 250: Performed by: SPECIALIST

## 2024-03-08 PROCEDURE — 21400001 HC TELEMETRY ROOM

## 2024-03-08 RX ADMIN — MUPIROCIN: 20 OINTMENT TOPICAL at 10:03

## 2024-03-08 RX ADMIN — PIPERACILLIN SODIUM AND TAZOBACTAM SODIUM 4.5 G: 4; .5 INJECTION, POWDER, LYOPHILIZED, FOR SOLUTION INTRAVENOUS at 06:03

## 2024-03-08 RX ADMIN — SODIUM CHLORIDE: 9 INJECTION, SOLUTION INTRAVENOUS at 04:03

## 2024-03-08 RX ADMIN — HYDROCODONE BITARTRATE AND ACETAMINOPHEN 1 TABLET: 7.5; 325 TABLET ORAL at 08:03

## 2024-03-08 RX ADMIN — HYDROCODONE BITARTRATE AND ACETAMINOPHEN 1 TABLET: 7.5; 325 TABLET ORAL at 07:03

## 2024-03-08 RX ADMIN — PIPERACILLIN SODIUM AND TAZOBACTAM SODIUM 4.5 G: 4; .5 INJECTION, POWDER, LYOPHILIZED, FOR SOLUTION INTRAVENOUS at 04:03

## 2024-03-08 RX ADMIN — SODIUM CHLORIDE: 9 INJECTION, SOLUTION INTRAVENOUS at 05:03

## 2024-03-08 RX ADMIN — MORPHINE SULFATE 2 MG: 2 INJECTION, SOLUTION INTRAMUSCULAR; INTRAVENOUS at 03:03

## 2024-03-08 RX ADMIN — MUPIROCIN: 20 OINTMENT TOPICAL at 08:03

## 2024-03-08 RX ADMIN — KETOROLAC TROMETHAMINE 30 MG: 30 INJECTION, SOLUTION INTRAMUSCULAR; INTRAVENOUS at 05:03

## 2024-03-08 RX ADMIN — PIPERACILLIN SODIUM AND TAZOBACTAM SODIUM 4.5 G: 4; .5 INJECTION, POWDER, LYOPHILIZED, FOR SOLUTION INTRAVENOUS at 10:03

## 2024-03-08 NOTE — PLAN OF CARE
Problem: Adult Inpatient Plan of Care  Goal: Plan of Care Review  Outcome: Ongoing, Progressing  Flowsheets (Taken 3/8/2024 1431)  Plan of Care Reviewed With: patient  Goal: Optimal Comfort and Wellbeing  Outcome: Ongoing, Progressing  Intervention: Monitor Pain and Promote Comfort  Flowsheets (Taken 3/8/2024 1431)  Pain Management Interventions:   around-the-clock dosing utilized   care clustered   quiet environment facilitated   relaxation techniques promoted  Intervention: Provide Person-Centered Care  Flowsheets (Taken 3/8/2024 1431)  Trust Relationship/Rapport:   care explained   choices provided   emotional support provided   empathic listening provided   questions answered   questions encouraged   thoughts/feelings acknowledged     Problem: Fall Injury Risk  Goal: Absence of Fall and Fall-Related Injury  Outcome: Ongoing, Progressing  Intervention: Identify and Manage Contributors  Flowsheets (Taken 3/8/2024 1431)  Self-Care Promotion:   independence encouraged   BADL personal objects within reach   BADL personal routines maintained   safe use of adaptive equipment encouraged  Medication Review/Management: medications reviewed  Intervention: Promote Injury-Free Environment  Flowsheets (Taken 3/8/2024 1431)  Safety Promotion/Fall Prevention:   assistive device/personal item within reach   bed alarm set   instructed to call staff for mobility   side rails raised x 2   Fall Risk signage in place   Fall Risk reviewed with patient/family     Problem: Infection  Goal: Absence of Infection Signs and Symptoms  Outcome: Ongoing, Progressing  Intervention: Prevent or Manage Infection  Flowsheets (Taken 3/8/2024 1431)  Fever Reduction/Comfort Measures: fluid intake increased  Infection Management: aseptic technique maintained     Problem: Pain Acute  Goal: Acceptable Pain Control and Functional Ability  Outcome: Ongoing, Progressing  Intervention: Develop Pain Management Plan  Flowsheets (Taken 3/8/2024 1431)  Pain  Management Interventions:   around-the-clock dosing utilized   care clustered   quiet environment facilitated   relaxation techniques promoted  Intervention: Prevent or Manage Pain  Flowsheets (Taken 3/8/2024 1431)  Sleep/Rest Enhancement:   relaxation techniques promoted   regular sleep/rest pattern promoted  Sensory Stimulation Regulation: quiet environment promoted  Bowel Elimination Promotion: adequate fluid intake promoted  Medication Review/Management: medications reviewed  Intervention: Optimize Psychosocial Wellbeing  Flowsheets (Taken 3/8/2024 1431)  Supportive Measures:   active listening utilized   decision-making supported   verbalization of feelings encouraged   relaxation techniques promoted

## 2024-03-08 NOTE — PLAN OF CARE
Patient has no active medical insurance. Email sent to [facility] for screening. Dr Morgan post op follow up appointment scheduled and added to AVS.

## 2024-03-08 NOTE — PROGRESS NOTES
[facility] [address] Medicine  Progress Note    Patient Name: Bharat Mcdonough  MRN: [id number]  Patient Class: IP- Inpatient   Admission Date: 3/6/2024  Length of Stay: 1 days  Attending Physician: Shruthi Barone MD  Primary Care Provider: Tonya, Primary Doctor        Subjective:     Principal Problem:Acute appendicitis with localized peritonitis        HPI:  The patient is a 28 y.o. male with no significant past medical history who presents with complaint of abdominal pain. He states that the pain began 3 days ago and is localized to his right side. He also complains of vomiting, fever and intermittent back pain. He rates his pain an 8/10 at present. He mentions that pain is worsened with urination and is accompanied by lightheadedness and weakness when it becomes severe. He complains of frequent emesis over the last 2 days but has not experienced any vomiting today. He reports taking 2 Tylenol earlier today around 3:00 p.m. for pain relief. He denies hematuria, testicular pain, cough, or sore throat.  On initial workup, the patient had a CT suspicious for acute appendicitis.  He will be admitted for further management of his likely appendicitis and General Surgery consult.    Overview/Hospital Course:  Mr. Ab Mcdonough presented with abdominal pain. Empirically started on Zosyn and General surgery consulted. Underwent lap appendectomy on 3/7.    Interval History: No acute events, abdominal little better, ate a small amount of food, passed some flatus and passed small bowel movement this morning. ( used for communication)    Review of Systems   Constitutional:  Negative for chills and fever.   Respiratory:  Negative for shortness of breath.    Gastrointestinal:  Positive for abdominal pain. Negative for nausea and vomiting.     Objective:     Vital Signs (Most Recent):  Temp: 98.8 °F (37.1 °C) (03/08/24 0747)  Pulse: 82 (03/08/24 1120)  Resp: 18 (03/08/24  0747)  BP: 121/70 (03/08/24 0747)  SpO2: 96 % (03/08/24 0747) Vital Signs (24h Range):  Temp:  [97.7 °F (36.5 °C)-99.3 °F (37.4 °C)] 98.8 °F (37.1 °C)  Pulse:  [68-95] 82  Resp:  [18-20] 18  SpO2:  [96 %-100 %] 96 %  BP: (105-128)/(58-81) 121/70     Weight: 62.6 kg (138 lb 0.1 oz)  Body mass index is 20.92 kg/m².    Intake/Output Summary (Last 24 hours) at 3/8/2024 1227  Last data filed at 3/8/2024 0645  Gross per 24 hour   Intake 1388.75 ml   Output 2760 ml   Net -1371.25 ml         Physical Exam  Vitals and nursing note reviewed.   Constitutional:       Appearance: Normal appearance. He is well-developed.   HENT:      Head: Normocephalic and atraumatic.      Nose: Nose normal.   Eyes:      Conjunctiva/sclera: Conjunctivae normal.   Cardiovascular:      Rate and Rhythm: Normal rate and regular rhythm.      Pulses:           Radial pulses are 2+ on the right side and 2+ on the left side.      Heart sounds: Normal heart sounds.   Pulmonary:      Effort: Pulmonary effort is normal. No respiratory distress.      Breath sounds: Normal breath sounds.   Abdominal:      General: Bowel sounds are normal. There is no distension.      Palpations: Abdomen is soft.      Tenderness: There is abdominal tenderness in the right lower quadrant. There is no guarding or rebound.   Musculoskeletal:         General: Normal range of motion.      Cervical back: Normal range of motion and neck supple.   Skin:     General: Skin is warm and dry.   Neurological:      Mental Status: He is alert and oriented to person, place, and time.      GCS: GCS eye subscore is 4. GCS verbal subscore is 5. GCS motor subscore is 6.      Motor: Motor function is intact.   Psychiatric:         Mood and Affect: Mood normal.         Speech: Speech normal.         Behavior: Behavior normal.             Significant Labs: All pertinent labs within the past 24 hours have been reviewed.    Significant Imaging: I have reviewed all pertinent imaging results/findings  "within the past 24 hours.    Assessment/Plan:      * Acute appendicitis with localized peritonitis  Leukocytosis  - Patient with abdominal pain and imaging with CT of the abdomen pelvis which showed "suspected dilated appendix arising from the posteromedial cecum with adjacent fat stranding and ill-defined fluid suspicious for acute appendicitis.  Previously noted mild wall thickening of the terminal ileum appears somewhat less conspicuous.  Mildly dilated loops of small bowel again identified within the lower abdomen and pelvis without discrete transition point which may reflect delayed transit/ileus secondary to pelvic inflammation.  A nonspecific distal enteritis is felt less likely." Clinical scenario all consistent with acute appendicitis.  - General surgery consulted patient underwent lap appendectomy on 3/7.  - Continue empiric Zosyn  - Pain slowly improving but WBC count remains elevated  - Patient tolerating diet, had return of bowel function   - Continue fluids, pain control and supportive care  - Trend with repeat laboratory studies, correct electrolytes as needed, increase activity      VTE Risk Mitigation (From admission, onward)           Ordered     Reason for No Pharmacological VTE Prophylaxis  Once        Question:  Reasons:  Answer:  Risk of Bleeding    03/07/24 0200     IP VTE LOW RISK PATIENT  Once         03/07/24 0200     Place sequential compression device  Until discontinued         03/07/24 0200                        Shruthi Barone MD  Department of Hospital Medicine   [facility] ([address])    "

## 2024-03-08 NOTE — SUBJECTIVE & OBJECTIVE
Interval History: No acute events, abdominal little better, ate a small amount of food, passed some flatus and passed small bowel movement this morning. ( used for communication)    Review of Systems   Constitutional:  Negative for chills and fever.   Respiratory:  Negative for shortness of breath.    Gastrointestinal:  Positive for abdominal pain. Negative for nausea and vomiting.     Objective:     Vital Signs (Most Recent):  Temp: 98.8 °F (37.1 °C) (03/08/24 0747)  Pulse: 82 (03/08/24 1120)  Resp: 18 (03/08/24 0747)  BP: 121/70 (03/08/24 0747)  SpO2: 96 % (03/08/24 0747) Vital Signs (24h Range):  Temp:  [97.7 °F (36.5 °C)-99.3 °F (37.4 °C)] 98.8 °F (37.1 °C)  Pulse:  [68-95] 82  Resp:  [18-20] 18  SpO2:  [96 %-100 %] 96 %  BP: (105-128)/(58-81) 121/70     Weight: 62.6 kg (138 lb 0.1 oz)  Body mass index is 20.92 kg/m².    Intake/Output Summary (Last 24 hours) at 3/8/2024 1227  Last data filed at 3/8/2024 0645  Gross per 24 hour   Intake 1388.75 ml   Output 2760 ml   Net -1371.25 ml         Physical Exam  Vitals and nursing note reviewed.   Constitutional:       Appearance: Normal appearance. He is well-developed.   HENT:      Head: Normocephalic and atraumatic.      Nose: Nose normal.   Eyes:      Conjunctiva/sclera: Conjunctivae normal.   Cardiovascular:      Rate and Rhythm: Normal rate and regular rhythm.      Pulses:           Radial pulses are 2+ on the right side and 2+ on the left side.      Heart sounds: Normal heart sounds.   Pulmonary:      Effort: Pulmonary effort is normal. No respiratory distress.      Breath sounds: Normal breath sounds.   Abdominal:      General: Bowel sounds are normal. There is no distension.      Palpations: Abdomen is soft.      Tenderness: There is abdominal tenderness in the right lower quadrant. There is no guarding or rebound.   Musculoskeletal:         General: Normal range of motion.      Cervical back: Normal range of motion and neck supple.   Skin:      General: Skin is warm and dry.   Neurological:      Mental Status: He is alert and oriented to person, place, and time.      GCS: GCS eye subscore is 4. GCS verbal subscore is 5. GCS motor subscore is 6.      Motor: Motor function is intact.   Psychiatric:         Mood and Affect: Mood normal.         Speech: Speech normal.         Behavior: Behavior normal.             Significant Labs: All pertinent labs within the past 24 hours have been reviewed.    Significant Imaging: I have reviewed all pertinent imaging results/findings within the past 24 hours.

## 2024-03-08 NOTE — PLAN OF CARE
Problem: Adult Inpatient Plan of Care  Goal: Plan of Care Review  Outcome: Ongoing, Progressing  Goal: Absence of Hospital-Acquired Illness or Injury  Outcome: Ongoing, Progressing     Problem: Fall Injury Risk  Goal: Absence of Fall and Fall-Related Injury  Outcome: Ongoing, Progressing     Problem: Bleeding (Appendectomy)  Goal: Absence of Bleeding  Outcome: Ongoing, Progressing     Problem: Bowel Motility Impaired (Appendectomy)  Goal: Effective Bowel Elimination  Outcome: Ongoing, Progressing     Problem: Fluid and Electrolyte Imbalance (Appendectomy)  Goal: Fluid and Electrolyte Balance  Outcome: Ongoing, Progressing     Problem: Infection (Appendectomy)  Goal: Absence of Infection Signs and Symptoms  Outcome: Ongoing, Progressing     Problem: Pain (Appendectomy)  Goal: Acceptable Pain Control  Outcome: Ongoing, Progressing

## 2024-03-08 NOTE — ASSESSMENT & PLAN NOTE
"Leukocytosis  - Patient with abdominal pain and imaging with CT of the abdomen pelvis which showed "suspected dilated appendix arising from the posteromedial cecum with adjacent fat stranding and ill-defined fluid suspicious for acute appendicitis.  Previously noted mild wall thickening of the terminal ileum appears somewhat less conspicuous.  Mildly dilated loops of small bowel again identified within the lower abdomen and pelvis without discrete transition point which may reflect delayed transit/ileus secondary to pelvic inflammation.  A nonspecific distal enteritis is felt less likely." Clinical scenario all consistent with acute appendicitis.  - General surgery consulted patient underwent lap appendectomy on 3/7.  - Continue empiric Zosyn  - Pain slowly improving but WBC count remains elevated  - Patient tolerating diet, had return of bowel function   - Continue fluids, pain control and supportive care  - Trend with repeat laboratory studies, correct electrolytes as needed, increase activity  "

## 2024-03-08 NOTE — HOSPITAL COURSE
"Mr. Ab Mcdonough presented with abdominal pain. Empirically started on Zosyn and General surgery consulted. Underwent lap appendectomy on 3/7. Clinically improved, tolerated regular diet without difficulty, had return of bowel function with multiple bowel movements, and was ambulating in the room.  WBC count normalized, he remained afebrile with stable for discharge on p.o. Augmentin, and close follow up arranged with Dr. Morgan for routine postop follow-up.  (Patient seen and examined on day of discharge)    * Acute appendicitis with localized peritonitis  Leukocytosis  - Patient with abdominal pain and imaging with CT of the abdomen pelvis which showed "suspected dilated appendix arising from the posteromedial cecum with adjacent fat stranding and ill-defined fluid suspicious for acute appendicitis.  Previously noted mild wall thickening of the terminal ileum appears somewhat less conspicuous.  Mildly dilated loops of small bowel again identified within the lower abdomen and pelvis without discrete transition point which may reflect delayed transit/ileus secondary to pelvic inflammation.  A nonspecific distal enteritis is felt less likely." Clinical scenario all consistent with acute appendicitis.  - General surgery consulted, patient underwent lap appendectomy on 3/7.  - Continued empiric Zosyn, completed almost 4 days of IV antibiotics  - Pain improved and WBC normalized, and he remained afebrile  - Patient tolerated diet, had return of bowel function with multiple bowel movements  - Continue fluids, pain control and supportive care  - Trended with repeat laboratory studies, corrected electrolytes as needed  - Patient was ambulating in room without difficulty  - He was transitioned to p.o. Augmentin for discharge and follow up arranged with Dr. Morgan probably will     "

## 2024-03-08 NOTE — PLAN OF CARE
Patient AAOx3, independent at baseline. Will need to establish care with PCP on discharge. Patient denies owning any DME. Friend to provide transportation home.    03/08/24 1448   Discharge Assessment   Assessment Type Discharge Planning Assessment   Confirmed/corrected address, phone number and insurance Yes   Confirmed Demographics Correct on Facesheet   Source of Information patient; utilized   Communicated MAME with patient/caregiver Date not available/Unable to determine   People in Home friend(s)   Do you expect to return to your current living situation? Yes   Do you have help at home or someone to help you manage your care at home? Yes   Who are your caregiver(s) and their phone number(s)? Samm Page (Friend)  [phone] (Mobile)   Prior to hospitilization cognitive status: Alert/Oriented   Current cognitive status: Alert/Oriented   Walking or Climbing Stairs Difficulty no   Dressing/Bathing Difficulty no   Equipment Currently Used at Home none   Readmission within 30 days? No   Do you currently have service(s) that help you manage your care at home? No   Do you take prescription medications? Yes   Do you have prescription coverage? Yes   Do you have any problems affording any of your prescribed medications? No   Is the patient taking medications as prescribed? yes   Who is going to help you get home at discharge? Samm Page (Friend)  [phone] (Mobile)   How do you get to doctors appointments? family or friend will provide   Are you on dialysis? No   Do you take coumadin? No   Discharge Plan A Home   DME Needed Upon Discharge  none   Discharge Plan discussed with: Patient   Transition of Care Barriers Unisured   Physical Activity   On average, how many days per week do you engage in moderate to strenuous exercise (like a brisk walk)? 0 days   On average, how many minutes do you engage in exercise at this level? 0 min   Financial Resource Strain   How hard is it for you to pay for the very  basics like food, housing, medical care, and heating? Somewhat   Stress   Do you feel stress - tense, restless, nervous, or anxious, or unable to sleep at night because your mind is troubled all the time - these days? Not at all   Social Connections   In a typical week, how many times do you talk on the phone with family, friends, or neighbors? More than 3   How often do you get together with friends or relatives? More than 3   How often do you attend Scientology or Amish services? Never   Do you belong to any clubs or organizations such as Scientology groups, unions, fraYellowBrck or athletic groups, or school groups? No   How often do you attend meetings of the clubs or organizations you belong to? Never   Are you , , , , never , or living with a partner? Never marrie   Alcohol Use   Q1: How often do you have a drink containing alcohol? Never   Q2: How many drinks containing alcohol do you have on a typical day when you are drinking? None   Q3: How often do you have six or more drinks on one occasion? Never     [facility] ([address])  Initial Discharge Assessment       Primary Care Provider: No, Primary Doctor    Admission Diagnosis: Appendicitis [K37]  Acute appendicitis [K35.80]    Admission Date: 3/6/2024  Expected Discharge Date:     Transition of Care Barriers: (P) Unisured    Payor: /     Extended Emergency Contact Information  Primary Emergency Contact: Samm Page  Mobile Phone: [phone]  Relation: Friend    Discharge Plan A: (P) Home       No Pharmacies Listed    Initial Assessment (most recent)       Adult Discharge Assessment - 03/08/24 1448          Discharge Assessment    Assessment Type Discharge Planning Assessment     Confirmed/corrected address, phone number and insurance Yes     Confirmed Demographics Correct on Facesheet     Source of Information patient; utilized     Communicated MAME with patient/caregiver Date not available/Unable to  determine     People in Home friend(s)     Do you expect to return to your current living situation? Yes     Do you have help at home or someone to help you manage your care at home? Yes     Who are your caregiver(s) and their phone number(s)? PageSamm (Friend)  [phone] (Mobile) (P)      Prior to hospitilization cognitive status: Alert/Oriented (P)      Current cognitive status: Alert/Oriented (P)      Walking or Climbing Stairs Difficulty no (P)      Dressing/Bathing Difficulty no (P)      Equipment Currently Used at Home none (P)      Readmission within 30 days? No (P)      Do you currently have service(s) that help you manage your care at home? No (P)      Do you take prescription medications? Yes (P)      Do you have prescription coverage? Yes (P)      Do you have any problems affording any of your prescribed medications? No (P)      Is the patient taking medications as prescribed? yes (P)      Who is going to help you get home at discharge? Samm Page (Friend)  [phone] (Mobile) (P)      How do you get to doctors appointments? family or friend will provide (P)      Are you on dialysis? No (P)      Do you take coumadin? No (P)      Discharge Plan A Home (P)      DME Needed Upon Discharge  none (P)      Discharge Plan discussed with: Patient (P)      Transition of Care Barriers Unisured (P)         Physical Activity    On average, how many days per week do you engage in moderate to strenuous exercise (like a brisk walk)? 0 days (P)      On average, how many minutes do you engage in exercise at this level? 0 min (P)         Financial Resource Strain    How hard is it for you to pay for the very basics like food, housing, medical care, and heating? Somewhat hard (P)         Stress    Do you feel stress - tense, restless, nervous, or anxious, or unable to sleep at night because your mind is troubled all the time - these days? Not at all (P)         Social Connections    In a typical week, how many  times do you talk on the phone with family, friends, or neighbors? More than three times a week (P)      How often do you get together with friends or relatives? More than three times a week (P)      How often do you attend Buddhism or Islam services? Never (P)      Do you belong to any clubs or organizations such as Buddhism groups, unions, fraternal or athletic groups, or school groups? No (P)      How often do you attend meetings of the clubs or organizations you belong to? Never (P)      Are you , , , , never , or living with a partner? Never  (P)         Alcohol Use    Q1: How often do you have a drink containing alcohol? Never (P)      Q2: How many drinks containing alcohol do you have on a typical day when you are drinking? Patient does not drink (P)      Q3: How often do you have six or more drinks on one occasion? Never (P)

## 2024-03-08 NOTE — PROGRESS NOTES
Postop day 1.  Status post laparoscopic appendectomy for gangrenous appendix    Subjective   No nausea vomiting  Appetite poor    PE  Low-grade temp  Vital signs stable  HEENT-normocephalic, anicteric   Neck-trachea midline   Chest-clear  Heart-regular rate and rhythm   Abdomen-soft, direct tenderness right lower quadrant, incisions-clean, dry, intact  Extremities-no tenderness    Lab  WBC 15 K  Electrolytes-WNL    Impression/plan   Surgically stable  Advance diet as tolerated  Encourage ambulation

## 2024-03-09 LAB
ALBUMIN SERPL BCP-MCNC: 3 G/DL (ref 3.5–5.2)
ALP SERPL-CCNC: 61 U/L (ref 55–135)
ALT SERPL W/O P-5'-P-CCNC: 13 U/L (ref 10–44)
ANION GAP SERPL CALC-SCNC: 12 MMOL/L (ref 8–16)
AST SERPL-CCNC: 18 U/L (ref 10–40)
BASOPHILS # BLD AUTO: 0.03 K/UL (ref 0–0.2)
BASOPHILS NFR BLD: 0.3 % (ref 0–1.9)
BILIRUB SERPL-MCNC: 0.7 MG/DL (ref 0.1–1)
BUN SERPL-MCNC: 8 MG/DL (ref 6–20)
CALCIUM SERPL-MCNC: 8.9 MG/DL (ref 8.7–10.5)
CHLORIDE SERPL-SCNC: 104 MMOL/L (ref 95–110)
CO2 SERPL-SCNC: 24 MMOL/L (ref 23–29)
CREAT SERPL-MCNC: 0.9 MG/DL (ref 0.5–1.4)
DIFFERENTIAL METHOD BLD: ABNORMAL
EOSINOPHIL # BLD AUTO: 0.1 K/UL (ref 0–0.5)
EOSINOPHIL NFR BLD: 0.9 % (ref 0–8)
ERYTHROCYTE [DISTWIDTH] IN BLOOD BY AUTOMATED COUNT: 11.9 % (ref 11.5–14.5)
EST. GFR  (NO RACE VARIABLE): >60 ML/MIN/1.73 M^2
GLUCOSE SERPL-MCNC: 84 MG/DL (ref 70–110)
HCT VFR BLD AUTO: 35.2 % (ref 40–54)
HGB BLD-MCNC: 12.1 G/DL (ref 14–18)
IMM GRANULOCYTES # BLD AUTO: 0.03 K/UL (ref 0–0.04)
IMM GRANULOCYTES NFR BLD AUTO: 0.3 % (ref 0–0.5)
LYMPHOCYTES # BLD AUTO: 1.4 K/UL (ref 1–4.8)
LYMPHOCYTES NFR BLD: 14.9 % (ref 18–48)
MAGNESIUM SERPL-MCNC: 1.9 MG/DL (ref 1.6–2.6)
MCH RBC QN AUTO: 30.4 PG (ref 27–31)
MCHC RBC AUTO-ENTMCNC: 34.4 G/DL (ref 32–36)
MCV RBC AUTO: 88 FL (ref 82–98)
MONOCYTES # BLD AUTO: 0.8 K/UL (ref 0.3–1)
MONOCYTES NFR BLD: 8.4 % (ref 4–15)
NEUTROPHILS # BLD AUTO: 7 K/UL (ref 1.8–7.7)
NEUTROPHILS NFR BLD: 75.2 % (ref 38–73)
NRBC BLD-RTO: 0 /100 WBC
PHOSPHATE SERPL-MCNC: 3 MG/DL (ref 2.7–4.5)
PLATELET # BLD AUTO: 255 K/UL (ref 150–450)
PMV BLD AUTO: 9.3 FL (ref 9.2–12.9)
POTASSIUM SERPL-SCNC: 3.7 MMOL/L (ref 3.5–5.1)
PROT SERPL-MCNC: 6.4 G/DL (ref 6–8.4)
RBC # BLD AUTO: 3.98 M/UL (ref 4.6–6.2)
SODIUM SERPL-SCNC: 140 MMOL/L (ref 136–145)
WBC # BLD AUTO: 9.29 K/UL (ref 3.9–12.7)

## 2024-03-09 PROCEDURE — 99024 POSTOP FOLLOW-UP VISIT: CPT | Mod: ,,, | Performed by: STUDENT IN AN ORGANIZED HEALTH CARE EDUCATION/TRAINING PROGRAM

## 2024-03-09 PROCEDURE — 83735 ASSAY OF MAGNESIUM: CPT | Performed by: SPECIALIST

## 2024-03-09 PROCEDURE — 85025 COMPLETE CBC W/AUTO DIFF WBC: CPT | Performed by: SPECIALIST

## 2024-03-09 PROCEDURE — 63600175 PHARM REV CODE 636 W HCPCS: Performed by: SPECIALIST

## 2024-03-09 PROCEDURE — 25000003 PHARM REV CODE 250: Performed by: SPECIALIST

## 2024-03-09 PROCEDURE — 84100 ASSAY OF PHOSPHORUS: CPT | Performed by: SPECIALIST

## 2024-03-09 PROCEDURE — 36415 COLL VENOUS BLD VENIPUNCTURE: CPT | Performed by: SPECIALIST

## 2024-03-09 PROCEDURE — 80053 COMPREHEN METABOLIC PANEL: CPT | Performed by: SPECIALIST

## 2024-03-09 PROCEDURE — 25000003 PHARM REV CODE 250: Performed by: HOSPITALIST

## 2024-03-09 PROCEDURE — 21400001 HC TELEMETRY ROOM

## 2024-03-09 RX ADMIN — PIPERACILLIN SODIUM AND TAZOBACTAM SODIUM 4.5 G: 4; .5 INJECTION, POWDER, LYOPHILIZED, FOR SOLUTION INTRAVENOUS at 11:03

## 2024-03-09 RX ADMIN — HYDROCODONE BITARTRATE AND ACETAMINOPHEN 1 TABLET: 7.5; 325 TABLET ORAL at 12:03

## 2024-03-09 RX ADMIN — HYDROCODONE BITARTRATE AND ACETAMINOPHEN 1 TABLET: 7.5; 325 TABLET ORAL at 04:03

## 2024-03-09 RX ADMIN — PIPERACILLIN SODIUM AND TAZOBACTAM SODIUM 4.5 G: 4; .5 INJECTION, POWDER, LYOPHILIZED, FOR SOLUTION INTRAVENOUS at 04:03

## 2024-03-09 RX ADMIN — SODIUM CHLORIDE: 9 INJECTION, SOLUTION INTRAVENOUS at 12:03

## 2024-03-09 RX ADMIN — MUPIROCIN: 20 OINTMENT TOPICAL at 08:03

## 2024-03-09 RX ADMIN — PIPERACILLIN SODIUM AND TAZOBACTAM SODIUM 4.5 G: 4; .5 INJECTION, POWDER, LYOPHILIZED, FOR SOLUTION INTRAVENOUS at 06:03

## 2024-03-09 RX ADMIN — HYDROCODONE BITARTRATE AND ACETAMINOPHEN 1 TABLET: 7.5; 325 TABLET ORAL at 08:03

## 2024-03-09 RX ADMIN — MUPIROCIN: 20 OINTMENT TOPICAL at 12:03

## 2024-03-09 NOTE — PLAN OF CARE
Problem: Adult Inpatient Plan of Care  Goal: Plan of Care Review  Outcome: Ongoing, Progressing  Goal: Patient-Specific Goal (Individualized)  Outcome: Ongoing, Progressing  Goal: Absence of Hospital-Acquired Illness or Injury  Outcome: Ongoing, Progressing  Goal: Optimal Comfort and Wellbeing  Outcome: Ongoing, Progressing     Problem: Fall Injury Risk  Goal: Absence of Fall and Fall-Related Injury  Outcome: Ongoing, Progressing     Problem: Infection  Goal: Absence of Infection Signs and Symptoms  Outcome: Ongoing, Progressing     Problem: Pain Acute  Goal: Acceptable Pain Control and Functional Ability  Outcome: Ongoing, Progressing     POC reviewed with patient. All questions and concerns addressed. Fall/safety precautions implemented and maintained. IVF continued. IV abx administered. Pain management provided. No acute events noted this shift. Please see flowsheet for full assessment and vitals. Bed locked in lowest position. Side rails up x2. Call bell within reach.

## 2024-03-09 NOTE — PROGRESS NOTES
Patient Name: Bharat Mcdonough  Date: 3/9/2024  Service: General Surgery    Subjective:  No acute events overnight. Pain well controlled. Developed severe cramping abdominal pain after small volume solid intake yesterday, resolved spontaneously but now with food fear. Tolerating liquids without difficulty. Denies nausea/vomiting. Passing both flatus and ~4 small BM since surgery without melena/hematochezia. Denies other concerns.     Medications:    Current Facility-Administered Medications:     0.9%  NaCl infusion, , Intravenous, Continuous, Logan Morgan Jr., MD, Last Rate: 125 mL/hr at 03/09/24 1239, New Bag at 03/09/24 1239    acetaminophen tablet 650 mg, 650 mg, Oral, Q4H PRN, Logan Morgan Jr., MD, 650 mg at 03/07/24 0222    dextrose 10% bolus 125 mL 125 mL, 12.5 g, Intravenous, PRN, Logan Morgan Jr., MD    dextrose 10% bolus 250 mL 250 mL, 25 g, Intravenous, PRN, Logan Morgan Jr., MD    diphenhydrAMINE injection 12.5 mg, 12.5 mg, Intravenous, Q30 Min PRN, Titus Escamilla MD    glucagon (human recombinant) injection 1 mg, 1 mg, Intramuscular, PRN, Logan Morgan Jr., MD    glucose chewable tablet 16 g, 16 g, Oral, PRN, Logan Morgan Jr., MD    glucose chewable tablet 24 g, 24 g, Oral, PRN, Logan Morgan Jr., MD    HYDROcodone-acetaminophen 7.5-325 mg per tablet 1 tablet, 1 tablet, Oral, Q6H PRN, Logan Morgan Jr., MD, 1 tablet at 03/09/24 1238    HYDROmorphone (PF) injection 0.4 mg, 0.4 mg, Intravenous, Q5 Min PRN, Titus Escamilla MD    ketorolac injection 30 mg, 30 mg, Intravenous, Q6H PRN, Logan Morgan Jr., MD, 30 mg at 03/08/24 0554    morphine injection 2 mg, 2 mg, Intravenous, Q4H PRN, Logan Morgan Jr., MD, 2 mg at 03/08/24 0357    mupirocin 2 % ointment, , Nasal, BID, Shruthi Barone MD, Given at 03/09/24 1238    naloxone 0.4 mg/mL injection 0.02 mg, 0.02 mg, Intravenous, PRN, Logan Morgan Jr., MD    ondansetron injection 4 mg, 4 mg, Intravenous, Q8H PRN, oLgan Morgan Jr., MD     oxyCODONE immediate release tablet 5 mg, 5 mg, Oral, Q4H PRN, Titus Escamilla MD, 5 mg at 03/07/24 1214    piperacillin-tazobactam (ZOSYN) 4.5 g in dextrose 5 % in water (D5W) 100 mL IVPB (MB+), 4.5 g, Intravenous, Q8H, Logan Morgan Jr., MD, Stopped at 03/09/24 1046    prochlorperazine injection Soln 5 mg, 5 mg, Intravenous, Q30 Min PRN, Titus Escamilla MD    sodium chloride 0.9% flush 10 mL, 10 mL, Intravenous, Q8H PRN, Logan Morgan Jr., MD    sodium chloride 0.9% flush 3 mL, 3 mL, Intravenous, PRN, Titus Escamilla MD    Vital Signs:  Vitals:    03/09/24 1238   BP:    Pulse:    Resp: 18   Temp:        In/Out:  Intake/Output - Last 3 Shifts         03/07 0700 03/08 0659 03/08 0700 03/09 0659 03/09 0700  03/10 0659    P.O. 600 1860     I.V. (mL/kg) 500 (8) 1096 (17.5)     IV Piggyback 1688.8 280.2     Total Intake(mL/kg) 2788.8 (44.5) 3236.2 (51.7)     Urine (mL/kg/hr) 2860 (1.9) 2150 (1.4)     Stool  0     Total Output 2860 2150     Net -71.3 +1086.2            Urine Occurrence  7 x     Stool Occurrence  0 x             Physical Exam:  General: Alert, oriented, in no apparent distress  HEENT: Sclera anicteric, trachea midline  Lungs: Normal respiratory rate and effort on room air  Abdomen: Soft, appropriate ava-incisional TTP, nondistended. Incisions clean/dry/intact without erythema or drainage.  Extremities: Warm, well perfused, no edema.  Neuro: Grossly intact, moves all extremities  Psych: Appropriate affect    Laboratory Studies:  Complete Blood Count:  Recent Labs   Lab 03/07/24  0422 03/08/24  0418 03/09/24  0240   WBC 17.32* 15.43* 9.29   RBC 4.33* 3.97* 3.98*   HGB 13.3* 12.1* 12.1*   HCT 38.3* 35.5* 35.2*    227 255     Basic Chemistry Panel:  Recent Labs   Lab 03/09/24  0240      K 3.7      CO2 24   BUN 8   CREATININE 0.9   CALCIUM 8.9   PHOS 3.0     Hepatic Panel:  Recent Labs   Lab 03/09/24  0240   AST 18   ALT 13   ALKPHOS 61   BILITOT 0.7   ALBUMIN 3.0*     Nutrition  Labs:  Recent Labs   Lab 03/09/24  0240   ALBUMIN 3.0*     Imaging Studies:  None new    Assessment:  Bharat Mcdonough is a 28 y.o. year old male with no significant PMH/PSH who presents with acute gangrenous appendicitis now s/p uncomplicated laparoscopic appendectomy 3/7/24, doing well.    Plan:  - WBC normalized, cont Zosyn while inpatient with plan to transition to Augmentin BID to complete total 7d course per Dr. Morgan  - Cont regular diet as tolerated in setting of resolving ileus  - Encourage ambulation/OOBTC  - LVX/SCD ppx  - Rest of care per primary; plan for tentative DC in AM pending improved PO intake and continued downtrending of WBC    Suhas Bennett MD  Staff Surgeon

## 2024-03-10 VITALS
DIASTOLIC BLOOD PRESSURE: 66 MMHG | OXYGEN SATURATION: 98 % | TEMPERATURE: 98 F | BODY MASS INDEX: 20.92 KG/M2 | HEIGHT: 68 IN | RESPIRATION RATE: 18 BRPM | WEIGHT: 138 LBS | HEART RATE: 73 BPM | SYSTOLIC BLOOD PRESSURE: 111 MMHG

## 2024-03-10 PROBLEM — D72.829 LEUKOCYTOSIS: Status: ACTIVE | Noted: 2024-03-10

## 2024-03-10 LAB
ALBUMIN SERPL BCP-MCNC: 3.1 G/DL (ref 3.5–5.2)
ALP SERPL-CCNC: 73 U/L (ref 55–135)
ALT SERPL W/O P-5'-P-CCNC: 51 U/L (ref 10–44)
ANION GAP SERPL CALC-SCNC: 9 MMOL/L (ref 8–16)
AST SERPL-CCNC: 55 U/L (ref 10–40)
BASOPHILS # BLD AUTO: 0.03 K/UL (ref 0–0.2)
BASOPHILS NFR BLD: 0.3 % (ref 0–1.9)
BILIRUB SERPL-MCNC: 0.6 MG/DL (ref 0.1–1)
BUN SERPL-MCNC: 4 MG/DL (ref 6–20)
CALCIUM SERPL-MCNC: 9.2 MG/DL (ref 8.7–10.5)
CHLORIDE SERPL-SCNC: 102 MMOL/L (ref 95–110)
CO2 SERPL-SCNC: 27 MMOL/L (ref 23–29)
CREAT SERPL-MCNC: 0.9 MG/DL (ref 0.5–1.4)
DIFFERENTIAL METHOD BLD: ABNORMAL
EOSINOPHIL # BLD AUTO: 0.1 K/UL (ref 0–0.5)
EOSINOPHIL NFR BLD: 1.3 % (ref 0–8)
ERYTHROCYTE [DISTWIDTH] IN BLOOD BY AUTOMATED COUNT: 11.9 % (ref 11.5–14.5)
EST. GFR  (NO RACE VARIABLE): >60 ML/MIN/1.73 M^2
GLUCOSE SERPL-MCNC: 94 MG/DL (ref 70–110)
HCT VFR BLD AUTO: 33.2 % (ref 40–54)
HGB BLD-MCNC: 11.6 G/DL (ref 14–18)
IMM GRANULOCYTES # BLD AUTO: 0.02 K/UL (ref 0–0.04)
IMM GRANULOCYTES NFR BLD AUTO: 0.2 % (ref 0–0.5)
LYMPHOCYTES # BLD AUTO: 1.5 K/UL (ref 1–4.8)
LYMPHOCYTES NFR BLD: 16.6 % (ref 18–48)
MAGNESIUM SERPL-MCNC: 1.9 MG/DL (ref 1.6–2.6)
MCH RBC QN AUTO: 30.9 PG (ref 27–31)
MCHC RBC AUTO-ENTMCNC: 34.9 G/DL (ref 32–36)
MCV RBC AUTO: 89 FL (ref 82–98)
MONOCYTES # BLD AUTO: 0.9 K/UL (ref 0.3–1)
MONOCYTES NFR BLD: 10 % (ref 4–15)
NEUTROPHILS # BLD AUTO: 6.5 K/UL (ref 1.8–7.7)
NEUTROPHILS NFR BLD: 71.6 % (ref 38–73)
NRBC BLD-RTO: 0 /100 WBC
PHOSPHATE SERPL-MCNC: 4.2 MG/DL (ref 2.7–4.5)
PLATELET # BLD AUTO: 289 K/UL (ref 150–450)
PMV BLD AUTO: 8.5 FL (ref 9.2–12.9)
POTASSIUM SERPL-SCNC: 3.8 MMOL/L (ref 3.5–5.1)
PROT SERPL-MCNC: 6.8 G/DL (ref 6–8.4)
RBC # BLD AUTO: 3.75 M/UL (ref 4.6–6.2)
SODIUM SERPL-SCNC: 138 MMOL/L (ref 136–145)
WBC # BLD AUTO: 9.13 K/UL (ref 3.9–12.7)

## 2024-03-10 PROCEDURE — 84100 ASSAY OF PHOSPHORUS: CPT | Performed by: HOSPITALIST

## 2024-03-10 PROCEDURE — 99024 POSTOP FOLLOW-UP VISIT: CPT | Mod: ,,, | Performed by: STUDENT IN AN ORGANIZED HEALTH CARE EDUCATION/TRAINING PROGRAM

## 2024-03-10 PROCEDURE — 85025 COMPLETE CBC W/AUTO DIFF WBC: CPT | Performed by: HOSPITALIST

## 2024-03-10 PROCEDURE — 25000003 PHARM REV CODE 250: Performed by: SPECIALIST

## 2024-03-10 PROCEDURE — 83735 ASSAY OF MAGNESIUM: CPT | Performed by: HOSPITALIST

## 2024-03-10 PROCEDURE — 36415 COLL VENOUS BLD VENIPUNCTURE: CPT | Performed by: HOSPITALIST

## 2024-03-10 PROCEDURE — 25000003 PHARM REV CODE 250: Performed by: HOSPITALIST

## 2024-03-10 PROCEDURE — 80053 COMPREHEN METABOLIC PANEL: CPT | Performed by: HOSPITALIST

## 2024-03-10 PROCEDURE — 63600175 PHARM REV CODE 636 W HCPCS: Performed by: SPECIALIST

## 2024-03-10 RX ORDER — HYDROCODONE BITARTRATE AND ACETAMINOPHEN 7.5; 325 MG/1; MG/1
1 TABLET ORAL EVERY 8 HOURS PRN
Qty: 20 TABLET | Refills: 0 | Status: SHIPPED | OUTPATIENT
Start: 2024-03-10

## 2024-03-10 RX ORDER — OXYCODONE HYDROCHLORIDE 5 MG/1
5 TABLET ORAL EVERY 4 HOURS PRN
Status: DISCONTINUED | OUTPATIENT
Start: 2024-03-10 | End: 2024-03-10 | Stop reason: HOSPADM

## 2024-03-10 RX ORDER — HYDROCODONE BITARTRATE AND ACETAMINOPHEN 7.5; 325 MG/1; MG/1
1 TABLET ORAL EVERY 6 HOURS PRN
Status: DISCONTINUED | OUTPATIENT
Start: 2024-03-10 | End: 2024-03-10 | Stop reason: HOSPADM

## 2024-03-10 RX ORDER — AMOXICILLIN AND CLAVULANATE POTASSIUM 875; 125 MG/1; MG/1
1 TABLET, FILM COATED ORAL EVERY 12 HOURS
Qty: 14 TABLET | Refills: 0 | Status: SHIPPED | OUTPATIENT
Start: 2024-03-10 | End: 2024-03-17

## 2024-03-10 RX ADMIN — MUPIROCIN: 20 OINTMENT TOPICAL at 09:03

## 2024-03-10 RX ADMIN — HYDROCODONE BITARTRATE AND ACETAMINOPHEN 1 TABLET: 7.5; 325 TABLET ORAL at 06:03

## 2024-03-10 RX ADMIN — PIPERACILLIN SODIUM AND TAZOBACTAM SODIUM 4.5 G: 4; .5 INJECTION, POWDER, LYOPHILIZED, FOR SOLUTION INTRAVENOUS at 06:03

## 2024-03-10 RX ADMIN — OXYCODONE HYDROCHLORIDE 5 MG: 5 TABLET ORAL at 10:03

## 2024-03-10 NOTE — SUBJECTIVE & OBJECTIVE
Interval History: No acute events, abdominal much better, ate a small amount of food, passed some flatus and passed small bowel movement x 3. Overall better and dressed and up in bed. ( used for communication)    Review of Systems   Constitutional:  Negative for chills and fever.   Respiratory:  Negative for shortness of breath.    Gastrointestinal:  Positive for abdominal pain. Negative for nausea and vomiting.     Objective:     Vital Signs (Most Recent):  Temp: 98.2 °F (36.8 °C) (03/09/24 2001)  Pulse: 89 (03/09/24 2001)  Resp: 18 (03/09/24 2021)  BP: 129/76 (03/09/24 2001)  SpO2: 97 % (03/09/24 2001) Vital Signs (24h Range):  Temp:  [98.2 °F (36.8 °C)-98.6 °F (37 °C)] 98.2 °F (36.8 °C)  Pulse:  [77-89] 89  Resp:  [16-20] 18  SpO2:  [96 %-98 %] 97 %  BP: (119-132)/(74-78) 129/76     Weight: 62.6 kg (138 lb 0.1 oz)  Body mass index is 20.92 kg/m².    Intake/Output Summary (Last 24 hours) at 3/9/2024 2035  Last data filed at 3/9/2024 0540  Gross per 24 hour   Intake 943.35 ml   Output 950 ml   Net -6.65 ml           Physical Exam  Vitals and nursing note reviewed.   Constitutional:       Appearance: Normal appearance. He is well-developed.   HENT:      Head: Normocephalic and atraumatic.      Nose: Nose normal.   Eyes:      Conjunctiva/sclera: Conjunctivae normal.   Cardiovascular:      Rate and Rhythm: Normal rate and regular rhythm.      Pulses:           Radial pulses are 2+ on the right side and 2+ on the left side.      Heart sounds: Normal heart sounds.   Pulmonary:      Effort: Pulmonary effort is normal. No respiratory distress.      Breath sounds: Normal breath sounds.   Abdominal:      General: Bowel sounds are normal. There is no distension.      Palpations: Abdomen is soft.      Tenderness: There is abdominal tenderness in the right lower quadrant. There is no guarding or rebound.      Comments: Markedly decreased TTP   Musculoskeletal:         General: Normal range of motion.       Cervical back: Normal range of motion and neck supple.   Skin:     General: Skin is warm and dry.   Neurological:      Mental Status: He is alert and oriented to person, place, and time.      GCS: GCS eye subscore is 4. GCS verbal subscore is 5. GCS motor subscore is 6.      Motor: Motor function is intact.   Psychiatric:         Mood and Affect: Mood normal.         Speech: Speech normal.         Behavior: Behavior normal.             Significant Labs: All pertinent labs within the past 24 hours have been reviewed.    Significant Imaging: I have reviewed all pertinent imaging results/findings within the past 24 hours.

## 2024-03-10 NOTE — PLAN OF CARE
03/10/24 1135   Final Note   Assessment Type Final Discharge Note   Anticipated Discharge Disposition Home   Hospital Resources/Appts/Education Provided Provided patient/caregiver with written discharge plan information   Post-Acute Status   Discharge Delays None known at this time     Pt states he lives independently at home.     Family to provide transportation home.    No DC needs from CM perspective.

## 2024-03-10 NOTE — PROGRESS NOTES
Patient Name: Bharat Mcdonough  Date: 3/10/2024  Service: General Surgery    Subjective:  No acute events overnight. Pain improving and well controlled on oral regimen. Tolerated regular diet yesterday/this morning without recurrent abdominal pain. Passing large volume flatus and small BM without melena/hematochezia yesterday. Ambulating, voiding freely. No other concerns.    Medications:    Current Facility-Administered Medications:     acetaminophen tablet 650 mg, 650 mg, Oral, Q4H PRN, Logan Morgan Jr., MD, 650 mg at 03/07/24 0222    dextrose 10% bolus 125 mL 125 mL, 12.5 g, Intravenous, PRN, Logan Morgan Jr., MD    dextrose 10% bolus 250 mL 250 mL, 25 g, Intravenous, PRN, Logan Morgan Jr., MD    diphenhydrAMINE injection 12.5 mg, 12.5 mg, Intravenous, Q30 Min PRN, Titus Escamilla MD    glucagon (human recombinant) injection 1 mg, 1 mg, Intramuscular, PRN, Logan Morgan Jr., MD    glucose chewable tablet 16 g, 16 g, Oral, PRN, Logan Morgan Jr., MD    glucose chewable tablet 24 g, 24 g, Oral, PRN, Logan Morgan Jr., MD    HYDROcodone-acetaminophen 7.5-325 mg per tablet 1 tablet, 1 tablet, Oral, Q6H PRN, Shruthi Barone MD    ketorolac injection 30 mg, 30 mg, Intravenous, Q6H PRN, Logan Morgan Jr., MD, 30 mg at 03/08/24 0554    mupirocin 2 % ointment, , Nasal, BID, Shruthi Barone MD, Given at 03/10/24 0919    naloxone 0.4 mg/mL injection 0.02 mg, 0.02 mg, Intravenous, PRN, Logan Morgan Jr., MD    ondansetron injection 4 mg, 4 mg, Intravenous, Q8H PRN, Logan Morgan Jr., MD    oxyCODONE immediate release tablet 5 mg, 5 mg, Oral, Q4H PRN, Shruthi Barone MD, 5 mg at 03/10/24 1031    piperacillin-tazobactam (ZOSYN) 4.5 g in dextrose 5 % in water (D5W) 100 mL IVPB (MB+), 4.5 g, Intravenous, Q8H, Logan Morgan Jr., MD, Stopped at 03/10/24 1008    prochlorperazine injection Soln 5 mg, 5 mg, Intravenous, Q30 Min PRN, Titus Escamilla MD    sodium chloride 0.9% flush 10 mL, 10 mL, Intravenous, Q8H  Cathy RICO John J. Jr., MD    sodium chloride 0.9% flush 3 mL, 3 mL, Intravenous, JACKY, Titus Escamilla MD    Vital Signs:  Vitals:    03/10/24 1031   BP:    Pulse:    Resp: 17   Temp:        In/Out:  Intake/Output - Last 3 Shifts         03/08 0700  03/09 0659 03/09 0700  03/10 0659 03/10 0700 03/11 0659    P.O. 1860 760 240    I.V. (mL/kg) 1096 (17.5)      IV Piggyback 280.2      Total Intake(mL/kg) 3236.2 (51.7) 760 (12.1) 240 (3.8)    Urine (mL/kg/hr) 2150 (1.4) 1000 (0.7)     Stool 0 0     Total Output 2150 1000     Net +1086.2 -240 +240           Urine Occurrence 7 x 2 x 3 x    Stool Occurrence 0 x 1 x             Physical Exam:  General: Alert, oriented, in no apparent distress  HEENT: Sclera anicteric, trachea midline  Lungs: Normal respiratory rate and effort on room air  Abdomen: Soft, appropriate ava-incisional TTP, nondistended. Incisions clean/dry/intact without erythema or drainage. Stable mild ecchymoses surrounding periumbilical trocar without blanching or tenderness.  Extremities: Warm, well perfused, no edema.  Neuro: Grossly intact, moves all extremities  Psych: Appropriate affect    Laboratory Studies:  Complete Blood Count:  Recent Labs   Lab 03/08/24  0418 03/09/24  0240 03/10/24  0655   WBC 15.43* 9.29 9.13   RBC 3.97* 3.98* 3.75*   HGB 12.1* 12.1* 11.6*   HCT 35.5* 35.2* 33.2*    255 289       Basic Chemistry Panel:  Recent Labs   Lab 03/10/24  0655      K 3.8      CO2 27   BUN 4*   CREATININE 0.9   CALCIUM 9.2   PHOS 4.2       Hepatic Panel:  Recent Labs   Lab 03/10/24  0655   AST 55*   ALT 51*   ALKPHOS 73   BILITOT 0.6   ALBUMIN 3.1*       Nutrition Labs:  Recent Labs   Lab 03/10/24  0655   ALBUMIN 3.1*       Imaging Studies:  None new    Assessment:  Bharat Mcdonough is a 28 y.o. year old male with no significant PMH/PSH who presents with acute gangrenous appendicitis now s/p uncomplicated laparoscopic appendectomy 3/7/24, doing well.    Plan:  - WBC  normalized, transition to Augmentin BID to complete total 7d course per Dr. Morgan  - Cont regular diet as tolerated in setting of resolving ileus  - Encourage ambulation/OOBTC  - LVX/SCD ppx  - Patient cleared for discharge from surgical standpoint, will coordinate outpatient follow up with Dr. Morgan in ~10 days.     Suhas Bennett MD  Staff Surgeon

## 2024-03-10 NOTE — ASSESSMENT & PLAN NOTE
"Leukocytosis  - Patient with abdominal pain and imaging with CT of the abdomen pelvis which showed "suspected dilated appendix arising from the posteromedial cecum with adjacent fat stranding and ill-defined fluid suspicious for acute appendicitis.  Previously noted mild wall thickening of the terminal ileum appears somewhat less conspicuous.  Mildly dilated loops of small bowel again identified within the lower abdomen and pelvis without discrete transition point which may reflect delayed transit/ileus secondary to pelvic inflammation.  A nonspecific distal enteritis is felt less likely." Clinical scenario all consistent with acute appendicitis.  - General surgery consulted, patient underwent lap appendectomy on 3/7.  - Continue empiric Zosyn  - Pain improving and WBC now normalized  - Patient tolerating diet, had return of bowel function   - Continue fluids, pain control and supportive care  - Trend with repeat laboratory studies, correct electrolytes as needed, increase activity  "

## 2024-03-10 NOTE — PROGRESS NOTES
[facility] [address] Medicine  Progress Note    Patient Name: Bharat Mcdonough  MRN: [id number]  Patient Class: IP- Inpatient   Admission Date: 3/6/2024  Length of Stay: 2 days  Attending Physician: Shruthi Barone MD  Primary Care Provider: Tonya, Primary Doctor        Subjective:     Principal Problem:Acute appendicitis with localized peritonitis        HPI:  The patient is a 28 y.o. male with no significant past medical history who presents with complaint of abdominal pain. He states that the pain began 3 days ago and is localized to his right side. He also complains of vomiting, fever and intermittent back pain. He rates his pain an 8/10 at present. He mentions that pain is worsened with urination and is accompanied by lightheadedness and weakness when it becomes severe. He complains of frequent emesis over the last 2 days but has not experienced any vomiting today. He reports taking 2 Tylenol earlier today around 3:00 p.m. for pain relief. He denies hematuria, testicular pain, cough, or sore throat.  On initial workup, the patient had a CT suspicious for acute appendicitis.  He will be admitted for further management of his likely appendicitis and General Surgery consult.    Overview/Hospital Course:  Mr. Ab Mcdonough presented with abdominal pain. Empirically started on Zosyn and General surgery consulted. Underwent lap appendectomy on 3/7.    Interval History: No acute events, abdominal much better, ate a small amount of food, passed some flatus and passed small bowel movement x 3. Overall better and dressed and up in bed. ( used for communication)    Review of Systems   Constitutional:  Negative for chills and fever.   Respiratory:  Negative for shortness of breath.    Gastrointestinal:  Positive for abdominal pain. Negative for nausea and vomiting.     Objective:     Vital Signs (Most Recent):  Temp: 98.2 °F (36.8 °C) (03/09/24 2001)  Pulse: 89 (03/09/24  2001)  Resp: 18 (03/09/24 2021)  BP: 129/76 (03/09/24 2001)  SpO2: 97 % (03/09/24 2001) Vital Signs (24h Range):  Temp:  [98.2 °F (36.8 °C)-98.6 °F (37 °C)] 98.2 °F (36.8 °C)  Pulse:  [77-89] 89  Resp:  [16-20] 18  SpO2:  [96 %-98 %] 97 %  BP: (119-132)/(74-78) 129/76     Weight: 62.6 kg (138 lb 0.1 oz)  Body mass index is 20.92 kg/m².    Intake/Output Summary (Last 24 hours) at 3/9/2024 2035  Last data filed at 3/9/2024 0540  Gross per 24 hour   Intake 943.35 ml   Output 950 ml   Net -6.65 ml           Physical Exam  Vitals and nursing note reviewed.   Constitutional:       Appearance: Normal appearance. He is well-developed.   HENT:      Head: Normocephalic and atraumatic.      Nose: Nose normal.   Eyes:      Conjunctiva/sclera: Conjunctivae normal.   Cardiovascular:      Rate and Rhythm: Normal rate and regular rhythm.      Pulses:           Radial pulses are 2+ on the right side and 2+ on the left side.      Heart sounds: Normal heart sounds.   Pulmonary:      Effort: Pulmonary effort is normal. No respiratory distress.      Breath sounds: Normal breath sounds.   Abdominal:      General: Bowel sounds are normal. There is no distension.      Palpations: Abdomen is soft.      Tenderness: There is abdominal tenderness in the right lower quadrant. There is no guarding or rebound.      Comments: Markedly decreased TTP   Musculoskeletal:         General: Normal range of motion.      Cervical back: Normal range of motion and neck supple.   Skin:     General: Skin is warm and dry.   Neurological:      Mental Status: He is alert and oriented to person, place, and time.      GCS: GCS eye subscore is 4. GCS verbal subscore is 5. GCS motor subscore is 6.      Motor: Motor function is intact.   Psychiatric:         Mood and Affect: Mood normal.         Speech: Speech normal.         Behavior: Behavior normal.             Significant Labs: All pertinent labs within the past 24 hours have been reviewed.    Significant Imaging:  "I have reviewed all pertinent imaging results/findings within the past 24 hours.    Assessment/Plan:      * Acute appendicitis with localized peritonitis  Leukocytosis  - Patient with abdominal pain and imaging with CT of the abdomen pelvis which showed "suspected dilated appendix arising from the posteromedial cecum with adjacent fat stranding and ill-defined fluid suspicious for acute appendicitis.  Previously noted mild wall thickening of the terminal ileum appears somewhat less conspicuous.  Mildly dilated loops of small bowel again identified within the lower abdomen and pelvis without discrete transition point which may reflect delayed transit/ileus secondary to pelvic inflammation.  A nonspecific distal enteritis is felt less likely." Clinical scenario all consistent with acute appendicitis.  - General surgery consulted, patient underwent lap appendectomy on 3/7.  - Continue empiric Zosyn  - Pain improving and WBC now normalized  - Patient tolerating diet, had return of bowel function   - Continue fluids, pain control and supportive care  - Trend with repeat laboratory studies, correct electrolytes as needed, increase activity      VTE Risk Mitigation (From admission, onward)           Ordered     Reason for No Pharmacological VTE Prophylaxis  Once        Question:  Reasons:  Answer:  Risk of Bleeding    03/07/24 0200     IP VTE LOW RISK PATIENT  Once         03/07/24 0200     Place sequential compression device  Until discontinued         03/07/24 0200                          Shruthi Barone MD  Department of Hospital Medicine   [facility] Surg ([address])    "

## 2024-03-10 NOTE — DISCHARGE SUMMARY
[facility] Surg [address] Medicine  Discharge Summary      Patient Name: Bharat Mcdonough  MRN: [id number]  [facility]: [id number]  Patient Class: IP- Inpatient  Admission Date: 3/6/2024  Hospital Length of Stay: 3 days  Discharge Date and Time: 3/10/2024  1:06 PM  Attending Physician: Shruthi Barone MD  Discharging Provider: Shruthi Barone MD  Primary Care Provider: Tonya, Primary Doctor    Primary Care Team: Networked reference to record PCT     HPI:   The patient is a 28 y.o. male with no significant past medical history who presents with complaint of abdominal pain. He states that the pain began 3 days ago and is localized to his right side. He also complains of vomiting, fever and intermittent back pain. He rates his pain an 8/10 at present. He mentions that pain is worsened with urination and is accompanied by lightheadedness and weakness when it becomes severe. He complains of frequent emesis over the last 2 days but has not experienced any vomiting today. He reports taking 2 Tylenol earlier today around 3:00 p.m. for pain relief. He denies hematuria, testicular pain, cough, or sore throat.  On initial workup, the patient had a CT suspicious for acute appendicitis.  He will be admitted for further management of his likely appendicitis and General Surgery consult.    Procedure(s) (LRB):  APPENDECTOMY, LAPAROSCOPIC (N/A)      Hospital Course:   Mr. Ab Mcdonough presented with abdominal pain. Empirically started on Zosyn and General surgery consulted. Underwent lap appendectomy on 3/7. Clinically improved, tolerated regular diet without difficulty, had return of bowel function with multiple bowel movements, and was ambulating in the room.  WBC count normalized, he remained afebrile with stable for discharge on p.o. Augmentin, and close follow up arranged with Dr. Morgan for routine postop follow-up.  (Patient seen and examined on day of discharge)    * Acute appendicitis with localized  "peritonitis  Leukocytosis  - Patient with abdominal pain and imaging with CT of the abdomen pelvis which showed "suspected dilated appendix arising from the posteromedial cecum with adjacent fat stranding and ill-defined fluid suspicious for acute appendicitis.  Previously noted mild wall thickening of the terminal ileum appears somewhat less conspicuous.  Mildly dilated loops of small bowel again identified within the lower abdomen and pelvis without discrete transition point which may reflect delayed transit/ileus secondary to pelvic inflammation.  A nonspecific distal enteritis is felt less likely." Clinical scenario all consistent with acute appendicitis.  - General surgery consulted, patient underwent lap appendectomy on 3/7.  - Continued empiric Zosyn, completed almost 4 days of IV antibiotics  - Pain improved and WBC normalized, and he remained afebrile  - Patient tolerated diet, had return of bowel function with multiple bowel movements  - Continue fluids, pain control and supportive care  - Trended with repeat laboratory studies, corrected electrolytes as needed  - Patient was ambulating in room without difficulty  - He was transitioned to p.o. Augmentin for discharge and follow up arranged with Dr. Morgan probably will        Goals of Care Treatment Preferences:  Code Status: Full Code      Consults:  General surgery    Service: Hospital Medicine    Final Active Diagnoses:    Diagnosis Date Noted POA    PRINCIPAL PROBLEM:  Acute appendicitis with localized peritonitis [K35.30] 03/07/2024 Yes    Leukocytosis [D72.829] 03/10/2024 Yes      Problems Resolved During this Admission:       Discharged Condition: good    Disposition: Home or Self Care    Follow Up:   Follow-up Information       Logan Morgan Jr., MD Follow up today.    Specialties: General Surgery, Vascular Surgery  Why: In 10-14 days, For wound re-check  Contact information:  [address] [id number] [phone]       "                     Patient Instructions:      Lifting restrictions   Order Comments: No heavy lifting x 1 week and no bathes but okay to shower until seen in follow up clinic with Dr. Morgan     Activity as tolerated       Significant Diagnostic Studies: N/A    Pending Diagnostic Studies:       Procedure Component Value Units Date/Time    Specimen to Pathology, Surgery General Surgery [[id number]] Collected: 03/07/24 1130    Order Status: Sent Lab Status: In process Updated: 03/07/24 0484    Specimen: Tissue            Medications:  Reconciled Home Medications:      Medication List        START taking these medications      amoxicillin-clavulanate 875-125mg 875-125 mg per tablet  Commonly known as: AUGMENTIN  Take 1 tablet by mouth every 12 (twelve) hours. for 7 days     HYDROcodone-acetaminophen 7.5-325 mg per tablet  Commonly known as: NORCO  Take 1 tablet by mouth every 8 (eight) hours as needed for Pain.            CONTINUE taking these medications      ibuprofen 800 MG tablet  Commonly known as: ADVIL,MOTRIN  Take 1 tablet (800 mg total) by mouth every 6 (six) hours as needed for Pain.              Indwelling Lines/Drains at time of discharge:   Lines/Drains/Airways       None                   Time spent on the discharge of patient: 50 minutes         Shruthi Barone MD  Department of Hospital Medicine  [facility] ([address])

## 2024-03-10 NOTE — NURSING
AVS reviewed with patient in [place]. Family member at bedside. (2) PIVs intact on removal. All belongings packed by patient. Denies further needs. Family to transport patient home. Wheelchair requested.

## 2024-03-12 LAB
BACTERIA BLD CULT: NORMAL
BACTERIA BLD CULT: NORMAL

## 2024-03-13 LAB
FINAL PATHOLOGIC DIAGNOSIS: NORMAL
GROSS: NORMAL
Lab: NORMAL

## (undated) DEVICE — SUT MCRYL PLUS 4-0 PS2 27IN

## (undated) DEVICE — PENCIL ELECTROSURG HOLST W/BLD

## (undated) DEVICE — SYR 10CC LUER LOCK

## (undated) DEVICE — GLOVE SENSICARE PI MICRO 8

## (undated) DEVICE — SUT VICRYL 0 27 CT-2

## (undated) DEVICE — SOL POVIDONE SCRUB IODINE 4 OZ

## (undated) DEVICE — TRAY CATH 1-LYR URIMTR 16FR

## (undated) DEVICE — IRRIGATOR ENDOSCOPY DISP.

## (undated) DEVICE — STAPLER INT LINEAR ARTC 3.5-45

## (undated) DEVICE — CART STAPLE FLEX ETX 3.5MM BLU

## (undated) DEVICE — Device

## (undated) DEVICE — NDL HYPO REG 25G X 1 1/2

## (undated) DEVICE — SYS SEE SHARP SCP ANTIFG LNG

## (undated) DEVICE — TROCAR KII FIOS 5MM X 100MM

## (undated) DEVICE — GLOVE SENSICARE PI MICRO 6.5

## (undated) DEVICE — SHEARS HARMONIC CRVD TIP 36CM

## (undated) DEVICE — NDL INSUFFLATION VERRES 120MM

## (undated) DEVICE — ELECTRODE BLD EXT INSUL 1

## (undated) DEVICE — TROCAR ENDO KII FIOS 12X100MM

## (undated) DEVICE — ELECTRODE REM PLYHSV RETURN 9

## (undated) DEVICE — ADHESIVE DERMABOND ADVANCED

## (undated) DEVICE — BAG TISSUE RETRIEVAL 225ML